# Patient Record
Sex: MALE | Race: BLACK OR AFRICAN AMERICAN | Employment: UNEMPLOYED | URBAN - METROPOLITAN AREA
[De-identification: names, ages, dates, MRNs, and addresses within clinical notes are randomized per-mention and may not be internally consistent; named-entity substitution may affect disease eponyms.]

---

## 2019-06-15 ENCOUNTER — HOSPITAL ENCOUNTER (INPATIENT)
Age: 44
LOS: 2 days | Discharge: HOME | DRG: 045 | End: 2019-06-17
Attending: EMERGENCY MEDICINE | Admitting: HOSPITALIST
Payer: MEDICAID

## 2019-06-15 ENCOUNTER — APPOINTMENT (OUTPATIENT)
Dept: CT IMAGING | Age: 44
DRG: 045 | End: 2019-06-15
Payer: MEDICAID

## 2019-06-15 ENCOUNTER — APPOINTMENT (OUTPATIENT)
Dept: GENERAL RADIOLOGY | Age: 44
DRG: 045 | End: 2019-06-15
Payer: MEDICAID

## 2019-06-15 DIAGNOSIS — I63.9 CEREBROVASCULAR ACCIDENT (CVA), UNSPECIFIED MECHANISM (HCC): ICD-10-CM

## 2019-06-15 DIAGNOSIS — G45.9 TIA (TRANSIENT ISCHEMIC ATTACK): Primary | ICD-10-CM

## 2019-06-15 DIAGNOSIS — R20.0 NUMBNESS: ICD-10-CM

## 2019-06-15 PROBLEM — I10 HYPERTENSION: Status: ACTIVE | Noted: 2019-06-15

## 2019-06-15 PROBLEM — Z72.0 TOBACCO ABUSE: Status: ACTIVE | Noted: 2019-06-15

## 2019-06-15 LAB
ALBUMIN SERPL-MCNC: 4.3 GM/DL (ref 3.4–5)
ALP BLD-CCNC: 73 IU/L (ref 40–128)
ALT SERPL-CCNC: 23 U/L (ref 10–40)
ANION GAP SERPL CALCULATED.3IONS-SCNC: 9 MMOL/L (ref 4–16)
APTT: 29.6 SECONDS (ref 21.2–33)
AST SERPL-CCNC: 17 IU/L (ref 15–37)
BASOPHILS ABSOLUTE: 0 K/CU MM
BASOPHILS RELATIVE PERCENT: 0.8 % (ref 0–1)
BILIRUB SERPL-MCNC: 0.2 MG/DL (ref 0–1)
BUN BLDV-MCNC: 11 MG/DL (ref 6–23)
CALCIUM SERPL-MCNC: 9 MG/DL (ref 8.3–10.6)
CHLORIDE BLD-SCNC: 101 MMOL/L (ref 99–110)
CO2: 25 MMOL/L (ref 21–32)
CREAT SERPL-MCNC: 0.9 MG/DL (ref 0.9–1.3)
DIFFERENTIAL TYPE: ABNORMAL
EOSINOPHILS ABSOLUTE: 0.2 K/CU MM
EOSINOPHILS RELATIVE PERCENT: 3.5 % (ref 0–3)
GFR AFRICAN AMERICAN: >60 ML/MIN/1.73M2
GFR NON-AFRICAN AMERICAN: >60 ML/MIN/1.73M2
GLUCOSE BLD-MCNC: 118 MG/DL (ref 70–99)
HCT VFR BLD CALC: 47.9 % (ref 42–52)
HEMOGLOBIN: 15.5 GM/DL (ref 13.5–18)
IMMATURE NEUTROPHIL %: 0.2 % (ref 0–0.43)
INR BLD: 0.97 INDEX
LYMPHOCYTES ABSOLUTE: 2.3 K/CU MM
LYMPHOCYTES RELATIVE PERCENT: 44.4 % (ref 24–44)
MCH RBC QN AUTO: 31.1 PG (ref 27–31)
MCHC RBC AUTO-ENTMCNC: 32.4 % (ref 32–36)
MCV RBC AUTO: 96.2 FL (ref 78–100)
MONOCYTES ABSOLUTE: 0.5 K/CU MM
MONOCYTES RELATIVE PERCENT: 9.7 % (ref 0–4)
NUCLEATED RBC %: 0 %
PDW BLD-RTO: 13.2 % (ref 11.7–14.9)
PLATELET # BLD: 248 K/CU MM (ref 140–440)
PMV BLD AUTO: 10.8 FL (ref 7.5–11.1)
POTASSIUM SERPL-SCNC: 3.7 MMOL/L (ref 3.5–5.1)
PROTHROMBIN TIME: 11.1 SECONDS (ref 9.12–12.5)
RBC # BLD: 4.98 M/CU MM (ref 4.6–6.2)
SEGMENTED NEUTROPHILS ABSOLUTE COUNT: 2.1 K/CU MM
SEGMENTED NEUTROPHILS RELATIVE PERCENT: 41.4 % (ref 36–66)
SODIUM BLD-SCNC: 135 MMOL/L (ref 135–145)
TOTAL IMMATURE NEUTOROPHIL: 0.01 K/CU MM
TOTAL NUCLEATED RBC: 0 K/CU MM
TOTAL PROTEIN: 7.3 GM/DL (ref 6.4–8.2)
TROPONIN T: <0.01 NG/ML
TSH HIGH SENSITIVITY: 0.94 UIU/ML (ref 0.27–4.2)
WBC # BLD: 5.1 K/CU MM (ref 4–10.5)

## 2019-06-15 PROCEDURE — 93005 ELECTROCARDIOGRAM TRACING: CPT | Performed by: EMERGENCY MEDICINE

## 2019-06-15 PROCEDURE — 99285 EMERGENCY DEPT VISIT HI MDM: CPT

## 2019-06-15 PROCEDURE — 6370000000 HC RX 637 (ALT 250 FOR IP): Performed by: HOSPITALIST

## 2019-06-15 PROCEDURE — 80053 COMPREHEN METABOLIC PANEL: CPT

## 2019-06-15 PROCEDURE — 85610 PROTHROMBIN TIME: CPT

## 2019-06-15 PROCEDURE — 71046 X-RAY EXAM CHEST 2 VIEWS: CPT

## 2019-06-15 PROCEDURE — 1200000000 HC SEMI PRIVATE

## 2019-06-15 PROCEDURE — 85025 COMPLETE CBC W/AUTO DIFF WBC: CPT

## 2019-06-15 PROCEDURE — 84484 ASSAY OF TROPONIN QUANT: CPT

## 2019-06-15 PROCEDURE — 84436 ASSAY OF TOTAL THYROXINE: CPT

## 2019-06-15 PROCEDURE — 84443 ASSAY THYROID STIM HORMONE: CPT

## 2019-06-15 PROCEDURE — 2580000003 HC RX 258: Performed by: HOSPITALIST

## 2019-06-15 PROCEDURE — 85730 THROMBOPLASTIN TIME PARTIAL: CPT

## 2019-06-15 PROCEDURE — 70450 CT HEAD/BRAIN W/O DYE: CPT

## 2019-06-15 RX ORDER — ATORVASTATIN CALCIUM 40 MG/1
40 TABLET, FILM COATED ORAL NIGHTLY
Status: DISCONTINUED | OUTPATIENT
Start: 2019-06-15 | End: 2019-06-17 | Stop reason: HOSPADM

## 2019-06-15 RX ORDER — NICOTINE 21 MG/24HR
1 PATCH, TRANSDERMAL 24 HOURS TRANSDERMAL DAILY
Status: DISCONTINUED | OUTPATIENT
Start: 2019-06-16 | End: 2019-06-17 | Stop reason: HOSPADM

## 2019-06-15 RX ORDER — SODIUM CHLORIDE 0.9 % (FLUSH) 0.9 %
10 SYRINGE (ML) INJECTION PRN
Status: DISCONTINUED | OUTPATIENT
Start: 2019-06-15 | End: 2019-06-17 | Stop reason: HOSPADM

## 2019-06-15 RX ORDER — SODIUM CHLORIDE 0.9 % (FLUSH) 0.9 %
10 SYRINGE (ML) INJECTION EVERY 12 HOURS SCHEDULED
Status: DISCONTINUED | OUTPATIENT
Start: 2019-06-15 | End: 2019-06-17 | Stop reason: HOSPADM

## 2019-06-15 RX ORDER — ASPIRIN 81 MG/1
81 TABLET ORAL DAILY
Status: DISCONTINUED | OUTPATIENT
Start: 2019-06-16 | End: 2019-06-17 | Stop reason: HOSPADM

## 2019-06-15 RX ORDER — ASPIRIN 81 MG/1
324 TABLET, CHEWABLE ORAL ONCE
Status: COMPLETED | OUTPATIENT
Start: 2019-06-15 | End: 2019-06-15

## 2019-06-15 RX ORDER — IBUPROFEN 200 MG
800 TABLET ORAL EVERY 6 HOURS PRN
COMMUNITY

## 2019-06-15 RX ORDER — ONDANSETRON 2 MG/ML
4 INJECTION INTRAMUSCULAR; INTRAVENOUS EVERY 6 HOURS PRN
Status: DISCONTINUED | OUTPATIENT
Start: 2019-06-15 | End: 2019-06-17 | Stop reason: HOSPADM

## 2019-06-15 RX ADMIN — ASPIRIN 81 MG 324 MG: 81 TABLET ORAL at 22:08

## 2019-06-15 RX ADMIN — SODIUM CHLORIDE, PRESERVATIVE FREE 10 ML: 5 INJECTION INTRAVENOUS at 23:54

## 2019-06-15 RX ADMIN — ATORVASTATIN CALCIUM 40 MG: 40 TABLET, FILM COATED ORAL at 23:54

## 2019-06-15 ASSESSMENT — PAIN SCALES - GENERAL: PAINLEVEL_OUTOF10: 0

## 2019-06-15 NOTE — LETTER
[facility] [place] [facility] [id number]  Phone: [phone]             June 18, 2019    Patient: Kenney Morrison   YOB: 1975   Date of Visit: 6/15/2019       To Whom It May Concern:    Kenney Morrison was seen and treated in our facility  beginning 6/15/2019 until 6/17/19. He may return to work on 6/18/19. Sincerely,     Dr Maria Teresa Musa M.D.          Signature:__________________________________

## 2019-06-15 NOTE — ED PROVIDER NOTES
Tobacco Use    Smoking status: Current Every Day Smoker     Packs/day: 1.00     Types: Cigarettes   Substance and Sexual Activity    Alcohol use: Not Currently    Drug use: Yes     Types: Marijuana    Sexual activity: Not Currently   Lifestyle    Physical activity:     Days per week: None     Minutes per session: None    Stress: None   Relationships    Social connections:     Talks on phone: None     Gets together: None     Attends Rastafarian service: None     Active member of club or organization: None     Attends meetings of clubs or organizations: None     Relationship status: None    Intimate partner violence:     Fear of current or ex partner: None     Emotionally abused: None     Physically abused: None     Forced sexual activity: None   Other Topics Concern    None   Social History Narrative    None       Medications/Allergies     Previous Medications    No medications on file     No Known Allergies     Physical Exam       ED Triage Vitals [06/15/19 1644]   BP Temp Temp Source Pulse Resp SpO2 Height Weight   (!) 180/95 98.5 °F (36.9 °C) Oral 76 16 98 % 6' (1.829 m) 185 lb (83.9 kg)     GENERAL APPEARANCE: Awake and alert. Cooperative. No acute distress. HEAD: Normocephalic. Atraumatic. EYES: Sclera anicteric. ENT: Tolerates saliva. No trismus. NECK: Supple. Trachea midline. CARDIO: RRR. Radial pulse 2+. LUNGS: Respirations unlabored. CTAB. ABDOMEN: Soft. Non-distended. Non-tender. EXTREMITIES: No acute deformities. No pitting edema  SKIN: Warm and dry. NEUROLOGICAL: No gross facial drooping. Moves all 4 extremities spontaneously. Cranial nerves II through XII are grossly intact, 5 out of 5 strength in the bilateral upper and lower extremities, sensation is intact throughout, negative pronator drift, intact finger-to-nose, negative heel-to-shin, patient ambulates without difficulty, alert and oriented x4  PSYCHIATRIC: Normal mood.      Diagnostics   Labs:  Results for orders placed or performed during the hospital encounter of 06/15/19   CBC Auto Differential   Result Value Ref Range    WBC 5.1 4.0 - 10.5 K/CU MM    RBC 4.98 4.6 - 6.2 M/CU MM    Hemoglobin 15.5 13.5 - 18.0 GM/DL    Hematocrit 47.9 42 - 52 %    MCV 96.2 78 - 100 FL    MCH 31.1 (H) 27 - 31 PG    MCHC 32.4 32.0 - 36.0 %    RDW 13.2 11.7 - 14.9 %    Platelets 406 498 - 829 K/CU MM    MPV 10.8 7.5 - 11.1 FL    Differential Type AUTOMATED DIFFERENTIAL     Segs Relative 41.4 36 - 66 %    Lymphocytes % 44.4 (H) 24 - 44 %    Monocytes % 9.7 (H) 0 - 4 %    Eosinophils % 3.5 (H) 0 - 3 %    Basophils % 0.8 0 - 1 %    Segs Absolute 2.1 K/CU MM    Lymphocytes # 2.3 K/CU MM    Monocytes # 0.5 K/CU MM    Eosinophils # 0.2 K/CU MM    Basophils # 0.0 K/CU MM    Nucleated RBC % 0.0 %    Total Nucleated RBC 0.0 K/CU MM    Total Immature Neutrophil 0.01 K/CU MM    Immature Neutrophil % 0.2 0 - 0.43 %   Comprehensive Metabolic Panel w/ Reflex to MG   Result Value Ref Range    Sodium 135 135 - 145 MMOL/L    Potassium 3.7 3.5 - 5.1 MMOL/L    Chloride 101 99 - 110 mMol/L    CO2 25 21 - 32 MMOL/L    BUN 11 6 - 23 MG/DL    CREATININE 0.9 0.9 - 1.3 MG/DL    Glucose 118 (H) 70 - 99 MG/DL    Calcium 9.0 8.3 - 10.6 MG/DL    Alb 4.3 3.4 - 5.0 GM/DL    Total Protein 7.3 6.4 - 8.2 GM/DL    Total Bilirubin 0.2 0.0 - 1.0 MG/DL    ALT 23 10 - 40 U/L    AST 17 15 - 37 IU/L    Alkaline Phosphatase 73 40 - 128 IU/L    GFR Non-African American >60 >60 mL/min/1.73m2    GFR African American >60 >60 mL/min/1.73m2    Anion Gap 9 4 - 16     Radiographs:  No results found. Procedures/EK lead EKG per my interpretation:  Sinus bradycardia  Axis is   Normal  QTc is  normal  There is no specific T wave changes appreciated. There is no specific ST wave changes appreciated.   Prior EKG to compare with was not available V4-V6 with motion artifact    ED Course and MDM   In brief, Jesika Blackburn is a 37 y.o. male who presented to the emergency department with numbness that was concerning for a TIA, however his CT scan demonstrated evidence of stroke with a resolved neuro exam at this time. Plan for admission. ED Medication Orders (From admission, onward)    None          Final Impression      1. TIA (transient ischemic attack)    2. Numbness    3.  Cerebrovascular accident (CVA), unspecified mechanism ([facility] [address]      DISPOSITION  : Admitted     (Please note that portions of this note may have been completed with a voice recognition program. Efforts were made to edit the dictations but occasionally words are mis-transcribed.)    Radha Fernando MD  [address] [facility] Griffin Marr MD  06/25/19 [id number]

## 2019-06-16 ENCOUNTER — APPOINTMENT (OUTPATIENT)
Dept: MRI IMAGING | Age: 44
DRG: 045 | End: 2019-06-16
Payer: MEDICAID

## 2019-06-16 ENCOUNTER — APPOINTMENT (OUTPATIENT)
Dept: ULTRASOUND IMAGING | Age: 44
DRG: 045 | End: 2019-06-16
Payer: MEDICAID

## 2019-06-16 ENCOUNTER — APPOINTMENT (OUTPATIENT)
Dept: CT IMAGING | Age: 44
DRG: 045 | End: 2019-06-16
Payer: MEDICAID

## 2019-06-16 LAB
APTT: 31.4 SECONDS (ref 21.2–33)
CHOLESTEROL: 158 MG/DL
ESTIMATED AVERAGE GLUCOSE: 131 MG/DL
FIBRINOGEN LEVEL: 306 MG/DL (ref 196.9–442.1)
FOLATE: 7.8 NG/ML (ref 3.1–17.5)
HBA1C MFR BLD: 6.2 % (ref 4.2–6.3)
HCT VFR BLD CALC: 46.5 % (ref 42–52)
HDLC SERPL-MCNC: 38 MG/DL
HEMOGLOBIN: 15.3 GM/DL (ref 13.5–18)
HOMOCYSTEINE: ABNORMAL UMOL/L (ref 0–10)
HOMOCYSTEINE: NORMAL UMOL/L (ref 0–10)
INR BLD: 1.02 INDEX
LDL CHOLESTEROL DIRECT: 109 MG/DL
MCH RBC QN AUTO: 31.2 PG (ref 27–31)
MCHC RBC AUTO-ENTMCNC: 32.9 % (ref 32–36)
MCV RBC AUTO: 94.7 FL (ref 78–100)
PDW BLD-RTO: 13 % (ref 11.7–14.9)
PLATELET # BLD: 222 K/CU MM (ref 140–440)
PMV BLD AUTO: 11.3 FL (ref 7.5–11.1)
PROTHROMBIN TIME: 11.6 SECONDS (ref 9.12–12.5)
RBC # BLD: 4.91 M/CU MM (ref 4.6–6.2)
TRIGL SERPL-MCNC: 131 MG/DL
VITAMIN B-12: 464.8 PG/ML (ref 211–911)
WBC # BLD: 4.9 K/CU MM (ref 4–10.5)

## 2019-06-16 PROCEDURE — 85610 PROTHROMBIN TIME: CPT

## 2019-06-16 PROCEDURE — 2580000003 HC RX 258: Performed by: HOSPITALIST

## 2019-06-16 PROCEDURE — 93880 EXTRACRANIAL BILAT STUDY: CPT

## 2019-06-16 PROCEDURE — 83721 ASSAY OF BLOOD LIPOPROTEIN: CPT

## 2019-06-16 PROCEDURE — 70496 CT ANGIOGRAPHY HEAD: CPT

## 2019-06-16 PROCEDURE — 85384 FIBRINOGEN ACTIVITY: CPT

## 2019-06-16 PROCEDURE — 81241 F5 GENE: CPT

## 2019-06-16 PROCEDURE — 82607 VITAMIN B-12: CPT

## 2019-06-16 PROCEDURE — 85305 CLOT INHIBIT PROT S TOTAL: CPT

## 2019-06-16 PROCEDURE — 70551 MRI BRAIN STEM W/O DYE: CPT

## 2019-06-16 PROCEDURE — 80061 LIPID PANEL: CPT

## 2019-06-16 PROCEDURE — 85303 CLOT INHIBIT PROT C ACTIVITY: CPT

## 2019-06-16 PROCEDURE — 85300 ANTITHROMBIN III ACTIVITY: CPT

## 2019-06-16 PROCEDURE — 83090 ASSAY OF HOMOCYSTEINE: CPT

## 2019-06-16 PROCEDURE — 86148 ANTI-PHOSPHOLIPID ANTIBODY: CPT

## 2019-06-16 PROCEDURE — 82746 ASSAY OF FOLIC ACID SERUM: CPT

## 2019-06-16 PROCEDURE — 99255 IP/OBS CONSLTJ NEW/EST HI 80: CPT | Performed by: PSYCHIATRY & NEUROLOGY

## 2019-06-16 PROCEDURE — 81240 F2 GENE: CPT

## 2019-06-16 PROCEDURE — 6370000000 HC RX 637 (ALT 250 FOR IP): Performed by: HOSPITALIST

## 2019-06-16 PROCEDURE — 85027 COMPLETE CBC AUTOMATED: CPT

## 2019-06-16 PROCEDURE — 1200000000 HC SEMI PRIVATE

## 2019-06-16 PROCEDURE — 83036 HEMOGLOBIN GLYCOSYLATED A1C: CPT

## 2019-06-16 PROCEDURE — 6360000002 HC RX W HCPCS: Performed by: HOSPITALIST

## 2019-06-16 PROCEDURE — 85240 CLOT FACTOR VIII AHG 1 STAGE: CPT

## 2019-06-16 PROCEDURE — 86147 CARDIOLIPIN ANTIBODY EA IG: CPT

## 2019-06-16 PROCEDURE — 6370000000 HC RX 637 (ALT 250 FOR IP): Performed by: INTERNAL MEDICINE

## 2019-06-16 PROCEDURE — 2580000003 HC RX 258: Performed by: NURSE PRACTITIONER

## 2019-06-16 PROCEDURE — 97161 PT EVAL LOW COMPLEX 20 MIN: CPT

## 2019-06-16 PROCEDURE — 36415 COLL VENOUS BLD VENIPUNCTURE: CPT

## 2019-06-16 PROCEDURE — 6360000004 HC RX CONTRAST MEDICATION: Performed by: NURSE PRACTITIONER

## 2019-06-16 PROCEDURE — 93010 ELECTROCARDIOGRAM REPORT: CPT | Performed by: INTERNAL MEDICINE

## 2019-06-16 PROCEDURE — 85730 THROMBOPLASTIN TIME PARTIAL: CPT

## 2019-06-16 PROCEDURE — 81291 MTHFR GENE: CPT

## 2019-06-16 RX ORDER — 0.9 % SODIUM CHLORIDE 0.9 %
10 VIAL (ML) INJECTION
Status: COMPLETED | OUTPATIENT
Start: 2019-06-16 | End: 2019-06-16

## 2019-06-16 RX ORDER — CLONIDINE HYDROCHLORIDE 0.1 MG/1
0.1 TABLET ORAL EVERY 4 HOURS PRN
Status: DISCONTINUED | OUTPATIENT
Start: 2019-06-16 | End: 2019-06-17

## 2019-06-16 RX ADMIN — SODIUM CHLORIDE, PRESERVATIVE FREE 10 ML: 5 INJECTION INTRAVENOUS at 09:29

## 2019-06-16 RX ADMIN — ENOXAPARIN SODIUM 40 MG: 40 INJECTION SUBCUTANEOUS at 08:37

## 2019-06-16 RX ADMIN — ATORVASTATIN CALCIUM 40 MG: 40 TABLET, FILM COATED ORAL at 20:24

## 2019-06-16 RX ADMIN — IOPAMIDOL 80 ML: 755 INJECTION, SOLUTION INTRAVENOUS at 09:29

## 2019-06-16 RX ADMIN — CLONIDINE HYDROCHLORIDE 0.1 MG: 0.1 TABLET ORAL at 20:23

## 2019-06-16 RX ADMIN — ASPIRIN 81 MG: 81 TABLET, COATED ORAL at 08:37

## 2019-06-16 RX ADMIN — SODIUM CHLORIDE, PRESERVATIVE FREE 10 ML: 5 INJECTION INTRAVENOUS at 08:37

## 2019-06-16 ASSESSMENT — PAIN SCALES - GENERAL
PAINLEVEL_OUTOF10: 0

## 2019-06-16 NOTE — PROGRESS NOTES
Physical Therapy    Facility/Department: [facility] 3E  Initial Assessment    NAME: Marcelino Gan  : 1975  MRN: [id number]    Date of Service: 2019    Discharge Recommendations:  Home independently      Assessment   Body structures, Functions, Activity limitations: Decreased cognition  Assessment: The patient is a 37year old male admitted with right facial droop, left sided weakness and some slurring of speech per pt account. Patient had similar symptoms about one week ago but these resolved. The pt reports the numbness has been intermittent about three times in the past day as well. CT head did show acute infarcts in the right frontal and parietal lobe. The pt was certainly eager to leave the hospital.  Neurology NP came in to see patient during evaluation and explained to the patient the need for more follow up, including a heart monitor. The patient became tearful, overwhelmed by the gravity of the information and not being able to be home on Father's Day which was the reason he made the trip to [place]. Aside from some memory problems (he left the water running after washing his hands), the pt demonstrated good strength and functional mobility. No visual or perceptual deficits were noted either. Do not anticipate he will require rehab follow up, but he is aware that he is at higher risk for another stroke. Prognosis: Excellent  Decision Making: Low Complexity  Clinical Presentation: uncomplicated  Patient Education: importance of quitting smoking and making slow but steady changes to lifestyle rather than trying to do everything at once and setting himself up for failure  No Skilled PT: Independent with functional mobility   REQUIRES PT FOLLOW UP: No  Activity Tolerance  Activity Tolerance: Patient Tolerated treatment well       Patient Diagnosis(es): The primary encounter diagnosis was TIA (transient ischemic attack).  Diagnoses of Numbness and Cerebrovascular accident (CVA), reach      AM-PAC Score     AM-PAC Inpatient Mobility without Stair Climbing Raw Score : 20 (06/16/19 1043)  AM-PAC Inpatient without Stair Climbing T-Scale Score : 60.57 (06/16/19 1043)  Mobility Inpatient CMS 0-100% Score: 0 (06/16/19 1043)  Mobility Inpatient without Stair CMS G-Code Modifier : 509 [address] (06/16/19 1043)       Goals  Short term goals  Time Frame for Short term goals: 1 session  Short term goal 1: Pt will participate in PT evaluation to establish appropriate discharge recommendations  Patient Goals   Patient goals : wants to go home       Therapy Time   Individual Concurrent Group Co-treatment   Time In 1005         Time Out 1025         Minutes [id number] [address]  #[id number]  6/16/2019  10:47 AM

## 2019-06-16 NOTE — ED NOTES
21:56 received dirty bed 3008 -- notified Abhijit Pacheco RN @ 21:59     Natali Santos  06/15/19 2201       Natali Santos  06/15/19 2202

## 2019-06-16 NOTE — ED NOTES
21:07 paged 1616 [facility] thru perfect serve     Nickie Clemons  06/15/19 6202

## 2019-06-16 NOTE — H&P
[facility] Hospitalist History & Physical      Name: [facility] Tonja    PCP: No primary care provider on file. Date of Admission: 6/15/2019    Admitting Physician: Nicolasa Waite MD Hospitalist    Date of Service: Pt seen/examined on 6/15/2019     Chief Complaint:  Numbness    History Of Present Illness: The patient is a 37 y.o. male with hypertension, tobacco abuse of 1ppd, who presented to ED with right facial droop, left upper and lower extremity weakness/numbness for 5 min earlier today. He was at rest.  He says he felt dizzy, and felt like he is going to pass out. His girl friend said he had slurry speech at that time. He had similar symptoms 1 week ago, which resolved in 10 minutes or so. In ED, he did not have symptoms initially, but during my evaluation, he complained of intermittent numbness and weakness of left upper and lower ext. CT head in ED showed evidence of Acute infarcts within the right frontal lobe and right parietal lobe. The case was discussed with the ED provider. Past Medical History Reviewed:    Patient  has a past medical history of Hypertension. Borderline diabetes    Past Surgical History Reviewed:    Patient denies    Medications Prior to Admission Reviewed:    Prior to Admission medications    Not on File   He was on hydrochlorithiazide  Ibuprofen prn. Allergies:  Patient has no known allergies. Social History:    TOBACCO:   reports that he has been smoking cigarettes. He has been smoking about 1.00 pack per day. He does not have any smokeless tobacco history on file. ETOH:  Denies. Drugs:  reports that he has current or past drug history. Drug: Marijuana. Occupation:      Family History:  Reviewed in detail. Positive as follows: Mother has stroke in 46s. Father has diabetes.        ROS:  Ten systems were reviewed and otherwise acutely negative except as noted in the HPI and documented below:     Physical Exam:  Vitals:  BP (!) 162/104 Pulse 54   Temp 98.5 °F (36.9 °C) (Oral)   Resp 22   Ht 6' (1.829 m)   Wt 185 lb (83.9 kg)   SpO2 97%   BMI 25.09 kg/m²   General: The patient appears as stated age. Well appearing, and in no distress. Mental status: Alert, Oriented x3. Coherent. No agitation. Eyes: CATHY. Normal conjunctiva. ENT/Mouth: normal appearing jaw and neck, no neck nodes or sinus tenderness. Clear oropharynx with moist mucous membrane. Cardiovascular:  normal rate, regular rhythm, normal S1, S2, no murmurs, rubs, clicks or gallops. No peripheral edema. Dorsal pedis pulses 2+ bilaterally. Respiratory: clear to auscultation, no wheezes, rales or rhonchi, symmetric air entry. Gastrointestinal: soft, nontender, nondistended, no masses or organomegaly. Genitourinary:  No CVA tenderness. Musculoskletal:  no clubbing or cyanosis. No joint swelling, warmth, or tenderness. Skin:  normal coloration and turgor, no rashes, no suspicious skin lesions noted. Neurologic: Normal speech, no focal findings. CN II-XII were intact. Muscle strength 5 out of 5 in ford upper and lower extremities. Arm drift test was normal.  Sensory was normal.  F-N exam was normal bilaterally. Hematologic/Lymphatic: No cervical lymphadenopathy. Data  (4-points for high level)  Laboratory this visit:  Reviewed    Radiology this visit:  Personally reviewed the following imaging films, and agree with the radiologist readings. Xr Chest Standard (2 Vw)    Result Date: 6/15/2019  EXAMINATION: TWO XRAY VIEWS OF THE CHEST 6/15/2019 7:42 pm COMPARISON: None. HISTORY: ORDERING SYSTEM PROVIDED HISTORY: weakness TECHNOLOGIST PROVIDED HISTORY: Reason for exam:->weakness Ordering Physician Provided Reason for Exam: weakness Acuity: Unknown Type of Exam: Unknown Initial encounter FINDINGS: The lungs are without acute focal process. There is no effusion or pneumothorax. The cardiomediastinal silhouette is without acute process.  The osseous structures are without acute process. No acute process. Ct Head Wo Contrast    Addendum Date: 6/15/2019    ADDENDUM: Findings were discussed with Mohsen Brown at 9:01 pm on 6/15/2019. Result Date: 6/15/2019  EXAMINATION: CT OF THE HEAD WITHOUT CONTRAST  6/15/2019 8:48 pm TECHNIQUE: CT of the head was performed without the administration of intravenous contrast. Dose modulation, iterative reconstruction, and/or weight based adjustment of the mA/kV was utilized to reduce the radiation dose to as low as reasonably achievable. COMPARISON: None. HISTORY: ORDERING SYSTEM PROVIDED HISTORY: left sided weakness, numbness that resolved TECHNOLOGIST PROVIDED HISTORY: Has a \"code stroke\" or \"stroke alert\" been called? ->No Ordering Physician Provided Reason for Exam: left sided weakness, numbness that resolved Acuity: Acute Type of Exam: Initial Relevant Medical/Surgical History: none FINDINGS: BRAIN/VENTRICLES: There is loss of gray-white differentiation within the paramedian right frontal lobe likely reflecting an infarct. No mass or shift or bleed is identified. There is an additional acute infarct within the high right parietal lobe. ORBITS: The visualized portion of the orbits demonstrate no acute abnormality. SINUSES: The visualized paranasal sinuses and mastoid air cells demonstrate no acute abnormality. SOFT TISSUES/SKULL:  No acute abnormality of the visualized skull or soft tissues. Acute infarcts within the right frontal lobe and right parietal lobe. EKG this visit:  Personally reviewed EKG and telemetry strip. Documents:  Recent previous records, labs, imaging in the EMR were reviewed. Assessments and plans: Active Problems:    Ischemic stroke ([facility] [address]    Hypertension    Tobacco abuse  Resolved Problems:    * No resolved hospital problems. *    Acute right hemisphere ischemic stroke with left sided weakness  - admit as inpatient.    - neuro check q4h  - start aspirin 325mg x1, then 81mg daily  - start lipitor 40mg daily. Check lipid panel  - hold BP meds for permissive hypertension  - check TSH, homocysteine, vit B12/folate  - check carotid US, echo  - check MRI/MRA brain. - neurology consultation  - PT/OT    Hypertension  - hold BP meds as above. Smoking  - counseled to quit today in ED  - nicotine patch. DVT prophylaxis - lovenox. The above assessments and plans were explained to the patient in lay language, who indicated understanding.       PHYSICIAN CERTIFICATION  I certify that [facility] MERI  JALYN is expected to be hospitalized for greater than 2 midnights based on the above assessment and plan:      Francisca Love MD  [facility]  Office Phone: [phone]

## 2019-06-16 NOTE — ED NOTES
21:16 Dr Ford Bolaños returned call -- parked call @ [phone] -- notified Dr Aruna Sanchez [address]  06/15/19 5984

## 2019-06-16 NOTE — PROGRESS NOTES
Hospitalist Progress Note      Name:  Shabbir Gonzales /Age/Sex: 1975  (37 y.o. male)   MRN & CSN:  [id number] & [id number] Admission Date/Time: 6/15/2019  5:27 PM   Location:  [address] [place] [address] PCP: No primary care provider on file. Hospital Day: 2    Assessment and Plan:   Shabbir Gonzales is a 37 y.o.  male  who presents with <principal problem not specified>      Acute right hemisphere ischemic stroke with left sided weakness  CTA: acute infarcts in the paramidline right frontal and parietal lobesOcclusion of the right NASREEN at the proximal A2.  neuro check q4h  start aspirin 325mg x1, then 81mg daily  start lipitor 40mg daily. lipid panel , A1C= 6.2  hold BP meds for permissive hypertension  TSH, homocysteine, vit B12/folate noted  Carotid US, echo  MRI/MRA brain. neurology consultation  PT/OT--> Home      Hypertension  - hold BP meds as above.      Smoking  - counseled to quit  - nicotine patch.          Chronic treatment continued per home medications unless  contraindicated by above plan and assessment. The above assessment/plan has been explained to the patient, who indicated understanding. Diet DIET CARDIAC;   DVT Prophylaxis [x] Lovenox, []  Heparin, [] SCDs, []No VTE prophylaxis, patient ambulating   GI Prophylaxis [] PPI, [] H2 Blocker, [x] No GI prophylaxis, patient is receiving diet/Tube Feeds   Code Status Full Code             History of Present Illness:     Chief Complaint:   37 y.o. male with PMH of  hypertension, tobacco abuse of 1ppd, who presented to ED with right facial droop, left upper and lower extremity weakness/numbness for 5 min. He says he felt dizzy, and felt like he is going to pass out. His girl friend said he had slurry speech at that time. He had similar symptoms 1 week ago, which resolved in 10 minutes or so.    CT head in ED showed evidence of Acute infarcts within the right frontal lobe and right parietal lobe    Patient was seen and examined at bedside today. Patient sitting up comfortably in bed in NAD. Ten point ROS reviewed negative, unless as noted above    Objective: Intake/Output Summary (Last 24 hours) at 6/16/2019 1114  Last data filed at 6/16/2019 0836  Gross per 24 hour   Intake 20 ml   Output --   Net 20 ml      Vitals:   Vitals:    06/16/19 0800   BP: (!) 153/102   Pulse: 63   Resp: 14   Temp: 98.1 °F (36.7 °C)   SpO2: 98%     Physical Exam:      GEN Awake male, sitting upright in bed in no apparent distress. Appears given age. EYES Pupils are equally round. No scleral erythema, discharge, or conjunctivitis. HENT Mucous membranes are moist.   RESP Clear to auscultation, no wheezes, rales or rhonchi. CARDIO/VASC S1/S2 auscultated. No murmurs  GI Abdomen is soft without significant tenderness, masses, or guarding. MSK No gross joint deformities. Spontaneous movement of all extremities  SKIN Normal coloration, warm, dry.   NEURO Grossly normal  PSYCH Awake, alert, oriented     Medications:   Medications:    sodium chloride flush  10 mL Intravenous 2 times per day    enoxaparin  40 mg Subcutaneous Daily    aspirin  81 mg Oral Daily    atorvastatin  40 mg Oral Nightly    nicotine  1 patch Transdermal Daily      Infusions:   PRN Meds:   sodium chloride flush 10 mL PRN   magnesium hydroxide 30 mL Daily PRN   ondansetron 4 mg Q6H PRN         Electronically signed by Thalia Philip MD on 6/16/2019 at 11:14 AM

## 2019-06-16 NOTE — ED NOTES
22:20 bed 3008 now clean and ready -- notified Carlos Mayer RN @ 22:27     Lisa Santos  06/15/19 2223

## 2019-06-16 NOTE — CONSULTS
Neurology Service Consult Note  [facility]   Patient Name: Rhonda Brewster  : 1975        Subjective:   Reason for consult: \"my left arm and leg are weak\"  37 y.o. right-handed male with past medical history hypertension and tobacco usepresenting [facility] with complaints of left arm and leg weakness, described as a clumsiness that's been occurring for the last 7 days. Patient reports he first noticed the symptoms on Saturday but they resolved within 30 minutes to an hour, however yesterday evening Saturday, while patient was here visiting family the symptoms recurred and did not go away that she reported to the emergency room. .  A stroke alert was called in the ER evaluation performed without any use of TPA or intervention is patient's last seen normal was greater than 7 days. Denies any history of stroke or seizure. He was not on aspirin. Denies history of atrial fibrillation. States he smoked 2 packs of cigarettes a day. Does not use recreational drugs. Is not alcohol dependent. States that he does have history of hypertension. His mom had a stroke 2 years ago. Symptoms have resolved during my exam.    Patient reports with his symptoms had no associated headache, lightheadedness or dizziness, no diplopia or loss of vision, no facial droop, dysarthria or aphasia. He did have associated numbness and arm that has resolved. He did not fall he did not hit his head, he did not have loss of consciousness. Patient reports that he has heart palpitations when he is anxious or having a panic attack but does not notice them at rest.  He does report that he snores but does not wake himself up from choking. She lives in [place], [place], his wife/girlfriend is on her way her name is . does express he wants to go back to [place]. Past Medical History:   Diagnosis Date    Hypertension     :   History reviewed.  No pertinent surgical A&O x 4, NAD, cooperative  HEENT: NC/AT, EOMI, PERRL, mmm, no carotid bruits, neck supple, no meningeal signs; Heart: regular  Lungs: no distress  Ext: no edema, no calf tenderness b/l  Psych: normal mood and affect  Skin: no rashes or lesions    NEUROLOGIC EXAM:    Mental Status: A&O to self, location, month and year, NAD, speech clear, language fluent, repetition and naming intact, follows commands appropriately    Cranial Nerve Exam:   CN II-XII: , PERRL, VFF, no nystagmus, no gaze paresis, sensation V1-V3 intact b/l, muscles of facial expression symmetric; hearing intact to conversational tone, palate elevates symmetrically, shoulder elevation symmetric and tongue protrudes midline with movement side to side. Motor Exam:       Strength 5/5 UE's/LE's b/l  Tone and bulk normal   No pronator drift    Deep Tendon Reflexes: 2/4 biceps, triceps, brachioradialis, patellar, and achilles b/l; flexor plantar responses b/l    Sensation: Intact light touch/temp UE's/LE's b/l    Coordination/Cerebellum:       Tremors--none      Rapidly alternating movements: no dysdiadochokinesia b/l                Heel-to-Shin: no dysmetria b/l      Finger-to-Nose: no dysmetria b/l    Gait and stance:      Gait: 3 no ataxia      LABS:     Recent Labs     06/15/19  1746   WBC 5.1      K 3.7      CO2 25   BUN 11   CREATININE 0.9   GLUCOSE 118*   INR 0.97     Results for Zaida Pabon (MRN [id number]) as of 6/16/2019 07:33   Ref. Range 6/16/2019 04:27   Hemoglobin A1C Latest Ref Range: 4.2 - 6.3 % 6.2     Results for Zaida Pabon (MRN [id number]) as of 6/16/2019 07:33   Ref. Range 6/16/2019 04:27   Hemoglobin A1C Latest Ref Range: 4.2 - 6.3 % 6.2     Hypercoaguable work up pending     IMAGING:    CT Head: stating acute infarctions R-MCA  CUS: 0-50% bilateally  MRI:   1. Scattered acute infarcts involving the right frontal lobe with a few acute   infarcts also seen in the right parietal lobe.    2. No evidence of mass effect

## 2019-06-17 VITALS
RESPIRATION RATE: 12 BRPM | HEIGHT: 72 IN | OXYGEN SATURATION: 97 % | DIASTOLIC BLOOD PRESSURE: 102 MMHG | SYSTOLIC BLOOD PRESSURE: 167 MMHG | TEMPERATURE: 97.6 F | HEART RATE: 65 BPM | WEIGHT: 203.1 LBS | BODY MASS INDEX: 27.51 KG/M2

## 2019-06-17 LAB
ANION GAP SERPL CALCULATED.3IONS-SCNC: 8 MMOL/L (ref 4–16)
BUN BLDV-MCNC: 12 MG/DL (ref 6–23)
CALCIUM SERPL-MCNC: 9 MG/DL (ref 8.3–10.6)
CHLORIDE BLD-SCNC: 104 MMOL/L (ref 99–110)
CO2: 25 MMOL/L (ref 21–32)
CREAT SERPL-MCNC: 0.9 MG/DL (ref 0.9–1.3)
GFR AFRICAN AMERICAN: >60 ML/MIN/1.73M2
GFR NON-AFRICAN AMERICAN: >60 ML/MIN/1.73M2
GLUCOSE BLD-MCNC: 100 MG/DL (ref 70–99)
LV EF: 53 %
LV EF: 58 %
LVEF MODALITY: NORMAL
LVEF MODALITY: NORMAL
MAGNESIUM: 2.2 MG/DL (ref 1.8–2.4)
POTASSIUM SERPL-SCNC: 4 MMOL/L (ref 3.5–5.1)
SODIUM BLD-SCNC: 137 MMOL/L (ref 135–145)
TROPONIN T: <0.01 NG/ML
TROPONIN T: <0.01 NG/ML

## 2019-06-17 PROCEDURE — 6370000000 HC RX 637 (ALT 250 FOR IP): Performed by: HOSPITALIST

## 2019-06-17 PROCEDURE — 36415 COLL VENOUS BLD VENIPUNCTURE: CPT

## 2019-06-17 PROCEDURE — 2580000003 HC RX 258: Performed by: HOSPITALIST

## 2019-06-17 PROCEDURE — 84484 ASSAY OF TROPONIN QUANT: CPT

## 2019-06-17 PROCEDURE — 7100000001 HC PACU RECOVERY - ADDTL 15 MIN

## 2019-06-17 PROCEDURE — 93306 TTE W/DOPPLER COMPLETE: CPT

## 2019-06-17 PROCEDURE — 7100000000 HC PACU RECOVERY - FIRST 15 MIN

## 2019-06-17 PROCEDURE — 6370000000 HC RX 637 (ALT 250 FOR IP): Performed by: INTERNAL MEDICINE

## 2019-06-17 PROCEDURE — 6360000002 HC RX W HCPCS: Performed by: INTERNAL MEDICINE

## 2019-06-17 PROCEDURE — 83735 ASSAY OF MAGNESIUM: CPT

## 2019-06-17 PROCEDURE — 99253 IP/OBS CNSLTJ NEW/EST LOW 45: CPT | Performed by: INTERNAL MEDICINE

## 2019-06-17 PROCEDURE — 80048 BASIC METABOLIC PNL TOTAL CA: CPT

## 2019-06-17 PROCEDURE — 93312 ECHO TRANSESOPHAGEAL: CPT

## 2019-06-17 PROCEDURE — 6360000002 HC RX W HCPCS: Performed by: HOSPITALIST

## 2019-06-17 RX ORDER — CHLORTHALIDONE 25 MG/1
25 TABLET ORAL DAILY
Qty: 30 TABLET | Refills: 3 | Status: SHIPPED | OUTPATIENT
Start: 2019-06-17

## 2019-06-17 RX ORDER — LISINOPRIL 10 MG/1
10 TABLET ORAL DAILY
Status: DISCONTINUED | OUTPATIENT
Start: 2019-06-17 | End: 2019-06-17 | Stop reason: HOSPADM

## 2019-06-17 RX ORDER — CHLORTHALIDONE 25 MG/1
25 TABLET ORAL DAILY
Status: DISCONTINUED | OUTPATIENT
Start: 2019-06-17 | End: 2019-06-17 | Stop reason: HOSPADM

## 2019-06-17 RX ORDER — ASPIRIN 81 MG/1
81 TABLET ORAL DAILY
Qty: 30 TABLET | Refills: 3 | Status: SHIPPED | OUTPATIENT
Start: 2019-06-18

## 2019-06-17 RX ORDER — NIFEDIPINE 30 MG/1
30 TABLET, EXTENDED RELEASE ORAL DAILY
Status: DISCONTINUED | OUTPATIENT
Start: 2019-06-17 | End: 2019-06-17 | Stop reason: HOSPADM

## 2019-06-17 RX ORDER — NIFEDIPINE 30 MG/1
30 TABLET, FILM COATED, EXTENDED RELEASE ORAL DAILY
Qty: 30 TABLET | Refills: 3 | Status: SHIPPED | OUTPATIENT
Start: 2019-06-17

## 2019-06-17 RX ORDER — LISINOPRIL 10 MG/1
10 TABLET ORAL DAILY
Qty: 30 TABLET | Refills: 3 | Status: SHIPPED | OUTPATIENT
Start: 2019-06-17

## 2019-06-17 RX ORDER — NIFEDIPINE 30 MG/1
30 TABLET, FILM COATED, EXTENDED RELEASE ORAL DAILY
Qty: 30 TABLET | Refills: 3 | Status: SHIPPED | OUTPATIENT
Start: 2019-06-17 | End: 2019-06-17

## 2019-06-17 RX ORDER — CHLORTHALIDONE 25 MG/1
25 TABLET ORAL DAILY
Qty: 30 TABLET | Refills: 3 | Status: SHIPPED | OUTPATIENT
Start: 2019-06-17 | End: 2019-06-17

## 2019-06-17 RX ORDER — ATORVASTATIN CALCIUM 40 MG/1
40 TABLET, FILM COATED ORAL NIGHTLY
Qty: 30 TABLET | Refills: 3 | Status: SHIPPED | OUTPATIENT
Start: 2019-06-17

## 2019-06-17 RX ORDER — HYDRALAZINE HYDROCHLORIDE 20 MG/ML
5 INJECTION INTRAMUSCULAR; INTRAVENOUS EVERY 6 HOURS PRN
Status: DISCONTINUED | OUTPATIENT
Start: 2019-06-17 | End: 2019-06-17

## 2019-06-17 RX ORDER — NICOTINE 21 MG/24HR
1 PATCH, TRANSDERMAL 24 HOURS TRANSDERMAL DAILY
Qty: 30 PATCH | Refills: 3 | Status: SHIPPED | OUTPATIENT
Start: 2019-06-18

## 2019-06-17 RX ORDER — ASPIRIN 81 MG/1
81 TABLET ORAL DAILY
Qty: 30 TABLET | Refills: 3 | Status: SHIPPED | OUTPATIENT
Start: 2019-06-18 | End: 2019-06-17

## 2019-06-17 RX ORDER — LISINOPRIL 10 MG/1
10 TABLET ORAL DAILY
Qty: 30 TABLET | Refills: 3 | Status: SHIPPED | OUTPATIENT
Start: 2019-06-17 | End: 2019-06-17

## 2019-06-17 RX ORDER — HYDRALAZINE HYDROCHLORIDE 20 MG/ML
10 INJECTION INTRAMUSCULAR; INTRAVENOUS EVERY 6 HOURS PRN
Status: DISCONTINUED | OUTPATIENT
Start: 2019-06-17 | End: 2019-06-17 | Stop reason: HOSPADM

## 2019-06-17 RX ORDER — ATORVASTATIN CALCIUM 40 MG/1
40 TABLET, FILM COATED ORAL NIGHTLY
Qty: 30 TABLET | Refills: 3 | Status: SHIPPED | OUTPATIENT
Start: 2019-06-17 | End: 2019-06-17

## 2019-06-17 RX ADMIN — CLONIDINE HYDROCHLORIDE 0.1 MG: 0.1 TABLET ORAL at 15:51

## 2019-06-17 RX ADMIN — LISINOPRIL 10 MG: 10 TABLET ORAL at 17:05

## 2019-06-17 RX ADMIN — ENOXAPARIN SODIUM 40 MG: 40 INJECTION SUBCUTANEOUS at 08:13

## 2019-06-17 RX ADMIN — HYDRALAZINE HYDROCHLORIDE 10 MG: 20 INJECTION INTRAMUSCULAR; INTRAVENOUS at 14:47

## 2019-06-17 RX ADMIN — SODIUM CHLORIDE, PRESERVATIVE FREE 10 ML: 5 INJECTION INTRAVENOUS at 08:13

## 2019-06-17 RX ADMIN — ASPIRIN 81 MG: 81 TABLET, COATED ORAL at 08:12

## 2019-06-17 RX ADMIN — NIFEDIPINE 30 MG: 30 TABLET, FILM COATED, EXTENDED RELEASE ORAL at 17:06

## 2019-06-17 ASSESSMENT — PAIN SCALES - GENERAL
PAINLEVEL_OUTOF10: 0

## 2019-06-17 NOTE — PROGRESS NOTES
Hospitalist Progress Note      Name:  Yadira Cantrell /Age/Sex: 1975  (37 y.o. male)   MRN & CSN:  [id number] & [id number] Admission Date/Time: 6/15/2019  5:27 PM   Location:  [address] [place] [address]- PCP: No primary care provider on file. Hospital Day: 3    Assessment and Plan:   Yadira Cantrell is a 37 y.o.  male  who presents with <principal problem not specified>      Acute right hemisphere ischemic stroke with left sided weakness  CTA: acute infarcts in the paramidline right frontal and parietal lobesOcclusion of the right NASREEN at the proximal A2.  neuro check q4h  start aspirin 325mg x1, then 81mg daily  start lipitor 40mg daily. lipid panel , A1C= 6.2  hold BP meds for permissive hypertension  TSH, homocysteine, vit B12/folate noted  Carotid US, echo  MRI/MRA brain. neurology consultation:  1. Needs cardiology and cardio-embolic work up  2. Recommending LINQ and MAMADOU  3. Defer anticoagulation until arrhythmia is proved, if deemed necessary need to wait 7 days to avoid hemorrhagic conversion  4. Needs extensive follow-up this [place] does not live in [place]. PT/OT--> Home      Hypertension  - BP medications modified   -Will keep the patient until BP is better controlled      Smoking  - counseled to quit  - nicotine patch.          Chronic treatment continued per home medications unless  contraindicated by above plan and assessment. The above assessment/plan has been explained to the patient, who indicated understanding. Diet Diet NPO Effective Now   DVT Prophylaxis [x] Lovenox, []  Heparin, [] SCDs, []No VTE prophylaxis, patient ambulating   GI Prophylaxis [] PPI, [] H2 Blocker, [x] No GI prophylaxis, patient is receiving diet/Tube Feeds   Code Status Full Code             History of Present Illness:     Chief Complaint:   37 y.o. male with PMH of  hypertension, tobacco abuse of 1ppd, who presented to ED with right facial droop, left upper and lower extremity weakness/numbness for 5 min.

## 2019-06-17 NOTE — CONSULTS
CARDIOLOGY CONSULT NOTE   Reason for consultation:  CONSULT     Referring physician:  Deonte Pantoja MD     Primary care physician: No primary care provider on file. Dear  Dr. Deonte Pantoja MD   Thanks for the consult. Chief Complaints :  Chief Complaint   Patient presents with    Numbness        History of present illness:Al is a 37 y. o.year old who presents with left arm and left leg weakness . HE has h/o HTn aned Tobacco abuse   He had MRI showing rigtht sided CVA, cardiology was asked to evaluate him further   HE says he does not use his meds regularly . Carotids are normal       Past medical history:    has a past medical history of CVA (cerebral vascular accident) ([address] Hypertension, and Tobacco abuse. Past surgical history:   has no past surgical history on file. Social History:   reports that he has been smoking cigarettes. He has been smoking about 1.00 pack per day. He does not have any smokeless tobacco history on file. He reports that he drank alcohol. He reports that he has current or past drug history. Drug: Marijuana.   Family history:   no family history of CAD, STROKE of DM at early age    No Known Allergies      hydrALAZINE (APRESOLINE) injection 10 mg Q6H PRN   NIFEdipine (PROCARDIA XL) extended release tablet 30 mg Daily   chlorthalidone (HYGROTON) tablet 25 mg Daily   lisinopril (PRINIVIL;ZESTRIL) tablet 10 mg Daily   sodium chloride flush 0.9 % injection 10 mL 2 times per day   sodium chloride flush 0.9 % injection 10 mL PRN   magnesium hydroxide (MILK OF MAGNESIA) 400 MG/5ML suspension 30 mL Daily PRN   ondansetron (ZOFRAN) injection 4 mg Q6H PRN   enoxaparin (LOVENOX) injection 40 mg Daily   aspirin EC tablet 81 mg Daily   atorvastatin (LIPITOR) tablet 40 mg Nightly   nicotine (NICODERM CQ) 21 MG/24HR 1 patch Daily     Current Facility-Administered Medications   Medication Dose Route Frequency Provider Last Rate Last Dose    hydrALAZINE (APRESOLINE) injection 10 mg 10 mg Intravenous Q6H PRN Carloz Justin MD   10 mg at 06/17/19 1447    NIFEdipine (PROCARDIA XL) extended release tablet 30 mg  30 mg Oral Daily Carloz Justin MD        chlorthalidone (HYGROTON) tablet 25 mg  25 mg Oral Daily Carloz Justin MD        lisinopril (PRINIVIL;ZESTRIL) tablet 10 mg  10 mg Oral Daily Carloz Justin MD        sodium chloride flush 0.9 % injection 10 mL  10 mL Intravenous 2 times per day Tri Swann MD   10 mL at 06/17/19 0813    sodium chloride flush 0.9 % injection 10 mL  10 mL Intravenous PRN Tri Swann MD        magnesium hydroxide (MILK OF MAGNESIA) 400 MG/5ML suspension 30 mL  30 mL Oral Daily PRN Tri Swann MD        ondansetron (ZOFRAN) injection 4 mg  4 mg Intravenous Q6H PRN Tri Swann MD        enoxaparin (LOVENOX) injection 40 mg  40 mg Subcutaneous Daily Tri Swann MD   40 mg at 06/17/19 0813    aspirin EC tablet 81 mg  81 mg Oral Daily Tri Swann MD   81 mg at 06/17/19 6795    atorvastatin (LIPITOR) tablet 40 mg  40 mg Oral Nightly Tri Swann MD   40 mg at 06/16/19 2024    nicotine (NICODERM CQ) 21 MG/24HR 1 patch  1 patch Transdermal Daily Tri Swann MD   1 patch at 06/17/19 5916     Review of Systems:   · Constitutional: No Fever or Weight Loss   · Eyes: No Decreased Vision  · ENT: No Headaches, Hearing Loss or Vertigo  · Cardiovascular: As per HPI  · Respiratory: As per HPI  · Gastrointestinal: No abdominal pain, appetite loss, blood in stools, constipation, diarrhea or heartburn  · Genitourinary: No dysuria, trouble voiding, or hematuria  · Musculoskeletal:  No gait disturbance, weakness or joint complaints  · Integumentary: No rash or pruritis  · Neurological: No TIA or stroke symptoms  · Psychiatric: No anxiety or depression  · Endocrine: No malaise, fatigue or temperature intolerance  · Hematologic/Lymphatic: No bleeding problems, blood clots or swollen lymph nodes  · Allergic/Immunologic: No nasal congestion or hives  All systems negative except as marked. Physical Examination:    Vitals:    06/17/19 1340 06/17/19 1348 06/17/19 1406 06/17/19 1551   BP: (!) 154/116 (!) 155/98 (!) 161/115 (!) 167/102   Pulse: 71  65    Resp: 21  12    Temp:       TempSrc:       SpO2: 97% 96% 97%    Weight:       Height:           General Appearance:  No distress, conversant    Constitutional:  Well developed, Well nourished, No acute distress, Non-toxic appearance. HENT:  Normocephalic, Atraumatic, Bilateral external ears normal, Oropharynx moist, No oral exudates, Nose normal. Neck- Normal range of motion, No tenderness, Supple, No stridor,no apical-carotid delay  Lymphatics : no palpable lymph nodes  Eyes:  PERRL, EOMI, Conjunctiva normal, No discharge. Respiratory:  Normal breath sounds, No respiratory distress, No wheezing, No chest tenderness. ,no use of accessory muscles, crackles Absent   Cardiovascular: (PMI) apex non displaced,no lifts no thrills, ankle swelling Absent  , 1+, s1 and s2 audible,Murmur. Absent , JVD not noted    Abdomen /GI:  Bowel sounds normal, Soft, No tenderness, No masses, No gross visceromegaly   :  No costovertebral angle tenderness   Musculoskeletal:  No edema, no tenderness, no deformities.  Back- no tenderness  Integument:  Well hydrated, no rash   Lymphatic:  No lymphadenopathy noted   Neurologic:  Alert & oriented x 3, CN 2-12 normal, normal motor function, normal sensory function, no focal deficits noted           Medical decision making and Data review:    Lab Review   Recent Labs     06/16/19  0427   WBC 4.9   HGB 15.3   HCT 46.5         Recent Labs     06/17/19  0340      K 4.0      CO2 25   BUN 12   CREATININE 0.9     Recent Labs     06/15/19  1746   AST 17   ALT 23   BILITOT 0.2   ALKPHOS 73     Recent Labs     06/15/19  1746 06/17/19  0340 06/17/19  0918   TROPONINT <0.010 <0.010 <0.010       No results for input(s): visualized. There is occlusion of the right anterior cerebral artery at the proximal A2 segment level. The anterior cerebral and middle cerebral arteries demonstrate no focal stenosis. POSTERIOR CIRCULATION: There is intracranial dominance of the left vertebral artery. The right vertebral artery functionally terminates as the right PICA. No flow-limiting stenosis of the basilar artery or bilateral posterior cerebral arteries. 1. Redemonstration of acute infarcts in the paramidline right frontal and parietal lobes, in the right anterior cerebral artery vascular territory distribution. 2. Occlusion of the right anterior cerebral artery at the proximal A2 segment level. Findings were discussed with SHERRILL SMITH at 10:34 a.m. on 06/16/2019. Xr Chest Standard (2 Vw)    Result Date: 6/15/2019  EXAMINATION: TWO XRAY VIEWS OF THE CHEST 6/15/2019 7:42 pm COMPARISON: None. HISTORY: ORDERING SYSTEM PROVIDED HISTORY: weakness TECHNOLOGIST PROVIDED HISTORY: Reason for exam:->weakness Ordering Physician Provided Reason for Exam: weakness Acuity: Unknown Type of Exam: Unknown Initial encounter FINDINGS: The lungs are without acute focal process. There is no effusion or pneumothorax. The cardiomediastinal silhouette is without acute process. The osseous structures are without acute process. No acute process. Ct Head Wo Contrast    Addendum Date: 6/15/2019    ADDENDUM: Findings were discussed with Sukhdev Guillen at 9:01 pm on 6/15/2019. Result Date: 6/15/2019  EXAMINATION: CT OF THE HEAD WITHOUT CONTRAST  6/15/2019 8:48 pm TECHNIQUE: CT of the head was performed without the administration of intravenous contrast. Dose modulation, iterative reconstruction, and/or weight based adjustment of the mA/kV was utilized to reduce the radiation dose to as low as reasonably achievable. COMPARISON: None.  HISTORY: ORDERING SYSTEM PROVIDED HISTORY: left sided weakness, numbness that resolved TECHNOLOGIST PROVIDED HISTORY: Has a \"code stroke\" or \"stroke alert\" been called? ->No Ordering Physician Provided Reason for Exam: left sided weakness, numbness that resolved Acuity: Acute Type of Exam: Initial Relevant Medical/Surgical History: none FINDINGS: BRAIN/VENTRICLES: There is loss of gray-white differentiation within the paramedian right frontal lobe likely reflecting an infarct. No mass or shift or bleed is identified. There is an additional acute infarct within the high right parietal lobe. ORBITS: The visualized portion of the orbits demonstrate no acute abnormality. SINUSES: The visualized paranasal sinuses and mastoid air cells demonstrate no acute abnormality. SOFT TISSUES/SKULL:  No acute abnormality of the visualized skull or soft tissues. Acute infarcts within the right frontal lobe and right parietal lobe. Vascular Carotid Duplex Bilateral    Result Date: 6/16/2019  EXAMINATION: ULTRASOUND EVALUATION OF THE CAROTID ARTERIES 6/16/2019 COMPARISON: None. HISTORY: ORDERING SYSTEM PROVIDED HISTORY: TIA TECHNOLOGIST PROVIDED HISTORY: Reason for exam:->TIA Ordering Physician Provided Reason for Exam: TIA Acuity: Acute Type of Exam: Initial Additional signs and symptoms: speech disturbance, rt facial droop, left sided numbness Relevant Medical/Surgical History: HTN FINDINGS: RIGHT: The right common carotid artery demonstrates peak systolic velocities of 105 cm/sec in the mid segments respectively. The right internal carotid artery demonstrates the systolic velocities of 37, 34, 46 cm/sec in the proximal, mid and distal segments respectively. The external carotid artery is patent. The vertebral artery demonstrates normal antegrade flow. No evidence of focal atherosclerotic plaque. ICA/CCA ratio of 0.4. LEFT: The left common carotid artery demonstrates peak systolic velocities of 991 cm/sec in the mid segments respectively.  The left internal carotid artery demonstrates the systolic velocities of 37, 58, 51 cm/sec in sent to the Results [address] ([facility]) on 6/16/2019 at 12:05 pm to be communicated to the referring/covering health care provider/office. All labs, medications and tests reviewed by myself including data  from outside source , patient and available family . Continue all other medications of all above medical condition listed as is. Impression:  Active Problems:    Ischemic stroke ([address]    Hypertension    Tobacco abuse    CVA (cerebral vascular accident) ([address]  Resolved Problems:    * No resolved hospital problems. *      Assessment: 37 y. o.year old with PMH of  has a past medical history of CVA (cerebral vascular accident) ([address] Hypertension, and Tobacco abuse. Plan and Recommendations:    CVA : will plan MAMADOU , Alternates and risks were discussed   ASA is 2 and Mallampati is 2  Uncontrolled HTN: start nifedipine, chlorthalidone and ace  DVT prophylaxis if no contraindication  6. Dyslipidemia: continue statins           Thank you  much for consult and giving us the opportunity in contributing in the care of this patient. Please feel free to call me for any questions.        Mona Goff MD, 6/17/2019 4:13 PM

## 2019-06-17 NOTE — PLAN OF CARE
Problem: Safety:  Goal: Free from accidental physical injury  Description  Free from accidental physical injury  6/17/2019 0756 by Sugey Azevedo RN  Outcome: Ongoing  6/16/2019 2322 by Jerald Castillo RN  Outcome: Ongoing  Goal: Free from intentional harm  Description  Free from intentional harm  6/17/2019 0756 by Sugey Azevedo RN  Outcome: Ongoing  6/16/2019 2322 by Jerald Castillo RN  Outcome: Ongoing     Problem: Daily Care:  Goal: Daily care needs are met  Description  Daily care needs are met  6/17/2019 0756 by Sugey Azevedo RN  Outcome: Ongoing  6/16/2019 2322 by Jerald Castillo RN  Outcome: Ongoing     Problem: Skin Integrity:  Goal: Skin integrity will stabilize  Description  Skin integrity will stabilize  6/17/2019 0756 by Sugey Azevedo RN  Outcome: Ongoing  6/16/2019 2322 by Jerald Castillo RN  Outcome: Ongoing

## 2019-06-17 NOTE — PROGRESS NOTES
Contacted lab for troponin results. None available yet. Per lab, specimen ordered at  had not been drawn. They are changing request now to stat so that  will see and come to draw.

## 2019-06-17 NOTE — DISCHARGE SUMMARY
Discharge Summary    Name:  Celia Rocha /Age/Sex: 1975  (37 y.o. male)   MRN & CSN:  [id number] & [id number] Admission Date/Time: 6/15/2019  5:27 PM   Attending:  Peter Mancia MD Discharging Physician: Peter Mancia MD     HPI:   Per Admission H&P   The patient is a 37 y.o. male with hypertension, tobacco abuse of 1ppd, who presented to ED with right facial droop, left upper and lower extremity weakness/numbness for 5 min earlier today. He was at rest.  He says he felt dizzy, and felt like he is going to pass out. His girl friend said he had slurry speech at that time. He had similar symptoms 1 week ago, which resolved in 10 minutes or so. In ED, he did not have symptoms initially, but during my evaluation, he complained of intermittent numbness and weakness of left upper and lower ext. CT head in ED showed evidence of Acute infarcts within the right frontal lobe and right parietal lobe. Hospital Course:   Celia Rocha is a 37 y.o.  male  who presents with right facial droop, left upper and lower extremity weakness/numbness    Acute right hemisphere ischemic stroke with left sided weakness  CTA: acute infarcts in the paramidline right frontal and parietal lobesOcclusion of the right NASREEN at the proximal A2.  aspirin  81mg daily and lipitor 40mg daily.     lipid panel , A1C= 6.2  MRI/MRA brain. Scattered acute infarcts involving the right frontal lobe with a few acute infarcts also seen in the right parietal lobe. The patient will follow up with his PCP in [facility]        Hypertension  Continue home medications      Smoking  - counseled to quit  - nicotine patch. The patient expressed appropriate understanding of and agreement with the discharge recommendations, medications, and plan.      Consults this admission:  IP CONSULT TO HOSPITALIST  IP CONSULT TO NEUROLOGY  IP CONSULT TO CARDIOLOGY  IP CONSULT TO CARDIOLOGY    Discharge Instruction:   Follow up appointments: Primary care physician:  within 2 weeks    Diet:  cardiac diet   Activity: activity as tolerated  Disposition: Discharged to:   [x]Home, []HHC, []SNF, []Acute Rehab, []Hospice   Condition on discharge: Stable    Discharge Medications:      [facility], [facility] Medication Instructions SPX:[id number]    Printed on:06/17/19 6923   Medication Information                      aspirin 81 MG EC tablet  Take 1 tablet by mouth daily             atorvastatin (LIPITOR) 40 MG tablet  Take 1 tablet by mouth nightly             ibuprofen (ADVIL;MOTRIN) 200 MG tablet  Take 800 mg by mouth every 6 hours as needed for Pain or Fever             nicotine (NICODERM CQ) 21 MG/24HR  Place 1 patch onto the skin daily                 Objective Findings at Discharge:   BP (!) 161/115   Pulse 65   Temp 97.6 °F (36.4 °C) (Oral)   Resp 12   Ht 6' (1.829 m)   Wt 203 lb 1.6 oz (92.1 kg)   SpO2 97%   BMI 27.55 kg/m²            PHYSICAL EXAM   GEN    Awake male, sitting upright in bed in no apparent distress. Appears given age. EYES   Pupils are equally round. No scleral erythema, discharge, or conjunctivitis. HENT  Mucous membranes are moist.   RESP  Clear to auscultation, no wheezes, rales or rhonchi. CARDIO/VASC           S1/S2 auscultated. No murmurs  GI        Abdomen is soft without significant tenderness, masses, or guarding. MSK    No gross joint deformities. Spontaneous movement of all extremities  SKIN    Normal coloration, warm, dry.   NEURO           Grossly normal  PSYCH            Awake, alert, oriented             BMP/CBC  Recent Labs     06/15/19  1746 06/16/19  0427 06/17/19  0340     --  137   K 3.7  --  4.0     --  104   CO2 25  --  25   BUN 11  --  12   CREATININE 0.9  --  0.9   WBC 5.1 4.9  --    HCT 47.9 46.5  --     222  --        IMAGING:    CTA HEAD W WO CONTRAST [[id number]] Collected: 06/16/19 0946      Order Status: Completed Updated: 06/17/19 4383     Narrative:       EXAMINATION:  CTA OF THE HEAD WITHOUT AND WITH CONTRAST  6/16/2019 9:38 am    TECHNIQUE:  CTA of the head/brain was performed without and with the administration of  intravenous contrast. Multiplanar reformatted images are provided for review. MIP images are provided for review. Dose modulation, iterative  reconstruction, and/or weight based adjustment of the mA/kV was utilized to  reduce the radiation dose to as low as reasonably achievable. COMPARISON:  Noncontrast CT head 06/15/2019    HISTORY:  ORDERING SYSTEM PROVIDED HISTORY: infarction  TECHNOLOGIST PROVIDED HISTORY:  Ordering Physician Provided Reason for Exam: infarction  Acuity: Acute  Type of Exam: Subsequent/Follow-up  Additional signs and symptoms: recent left sided weakness, numbness, now  resolved  Relevant Medical/Surgical History: 80cc [id number]    FINDINGS:    CT HEAD:    BRAIN/VENTRICLES:  No acute intracranial hemorrhage or extraaxial fluid  collection.  Acute infarcts in the paramedian right frontal and parietal  lobes, in the right anterior cerebral artery vascular territory distribution,  again demonstrated.  Remote right basal ganglia lacunar infarct is unchanged. No evidence of mass, mass effect or midline shift. No evidence of  hydrocephalus.  Basal cisterns are patent. ORBITS: The visualized portion of the orbits demonstrate no acute abnormality. SINUSES:  The visualized paranasal sinuses and mastoid air cells demonstrate  no acute abnormality. SOFT TISSUES/SKULL: No acute abnormality of the visualized skull or soft  tissues. CTA HEAD:    ANTERIOR CIRCULATION: The internal carotid arteries are normal in course and  caliber without focal stenosis.  Right A1 segment is hypoplastic.  An  anterior communicating artery is visualized.  There is occlusion of the right  anterior cerebral artery at the proximal A2 segment level.  The anterior  cerebral and middle cerebral arteries demonstrate no focal stenosis.     POSTERIOR CIRCULATION: There is intracranial dominance of the left vertebral  artery.  The right vertebral artery functionally terminates as the right  PICA.  No flow-limiting stenosis of the basilar artery or bilateral posterior  cerebral arteries.     Impression:       1. Redemonstration of acute infarcts in the paramidline right frontal and  parietal lobes, in the right anterior cerebral artery vascular territory  distribution. 2. Occlusion of the right anterior cerebral artery at the proximal A2 segment  level. Findings were discussed with SHERRILL Fink at 10:34 a.m. on 06/16/2019.     MRI brain without contrast [[id number]] Collected: 06/16/19 1202     Order Status: Completed Updated: 06/16/19 1208     Narrative:       EXAMINATION:  MRI OF THE BRAIN WITHOUT CONTRAST  6/16/2019 9:37 am    TECHNIQUE:  Multiplanar multisequence MRI of the brain was performed without the  administration of intravenous contrast.    COMPARISON:  None. HISTORY:  ORDERING SYSTEM PROVIDED HISTORY: TIA  TECHNOLOGIST PROVIDED HISTORY:  Ordering Physician Provided Reason for Exam: c/o r facial numbness, lue and  lle tingling, nkt, no sx to shauna  per pt.    JG  Acuity: Unknown  Type of Exam: Unknown    Initial evaluation. FINDINGS:  INTRACRANIAL STRUCTURES/VENTRICLES: Scattered acute infarcts are seen within  the right frontal lobe.  A few acute infarcts are also seen within the right  parietal lobe.  No mass effect or midline shift. No evidence of an acute  intracranial hemorrhage.  The ventricles and sulci are normal in size and  configuration.  The sellar/suprasellar regions appear unremarkable.  The  normal signal voids within the major intracranial vessels appear maintained  at the skull base. ORBITS: The visualized portion of the orbits demonstrate no acute abnormality. SINUSES: No acute abnormality seen of the paranasal sinuses and mastoid air  cells.     BONES/SOFT TISSUES: The bone marrow signal intensity appears normal. The 0.7.     Impression:       The right internal carotid artery demonstrates 0-50% stenosis . The left internal carotid artery demonstrates 0-50% stenosis . Bilateral vertebral arteries are patent with flow in the normal direction.     MRA head w/o contrast [[id number]]      Order Status: Canceled      CT Head WO Contrast [[id number]] Collected: 06/15/19 2056     Order Status: Completed Updated: 06/15/19 2104     Addenda:         ADDENDUM: Findings were discussed with Juanito Ibarra at 9:01 pm on 6/15/2019.   Signed: 06/15/19 2104 by Sin Guerra MD     Narrative:       EXAMINATION:  CT OF THE HEAD WITHOUT CONTRAST  6/15/2019 8:48 pm    TECHNIQUE:  CT of the head was performed without the administration of intravenous  contrast. Dose modulation, iterative reconstruction, and/or weight based  adjustment of the mA/kV was utilized to reduce the radiation dose to as low  as reasonably achievable. COMPARISON:  None. HISTORY:  ORDERING SYSTEM PROVIDED HISTORY: left sided weakness, numbness that resolved  TECHNOLOGIST PROVIDED HISTORY:  Has a \"code stroke\" or \"stroke alert\" been called? ->No  Ordering Physician Provided Reason for Exam: left sided weakness, numbness  that resolved  Acuity: Acute  Type of Exam: Initial  Relevant Medical/Surgical History: none    FINDINGS:  BRAIN/VENTRICLES: There is loss of gray-white differentiation within the  paramedian right frontal lobe likely reflecting an infarct.  No mass or shift  or bleed is identified. [place] is an additional acute infarct within the high  right parietal lobe. ORBITS: The visualized portion of the orbits demonstrate no acute abnormality. SINUSES: The visualized paranasal sinuses and mastoid air cells demonstrate  no acute abnormality.     SOFT TISSUES/SKULL:  No acute abnormality of the visualized skull or soft  tissues.     Impression:       Acute infarcts within the right frontal lobe and right parietal lobe.     XR CHEST STANDARD (2 VW) [[id number]] Collected: 06/15/19 1950     Order Status: Completed Specimen: Chest Updated: 06/15/19 1953     Narrative:       EXAMINATION:  TWO XRAY VIEWS OF THE CHEST    6/15/2019 7:42 pm    COMPARISON:  None. HISTORY:  ORDERING SYSTEM PROVIDED HISTORY: weakness  TECHNOLOGIST PROVIDED HISTORY:  Reason for exam:->weakness  Ordering Physician Provided Reason for Exam: weakness  Acuity: Unknown  Type of Exam: Unknown    Initial encounter    FINDINGS:  The lungs are without acute focal process.  There is no effusion or  pneumothorax. The cardiomediastinal silhouette is without acute process. The  osseous structures are without acute process.     Impression:       No acute process.        Discharge Time of 35 minutes    Electronically signed by Peter Mancia MD on 6/17/2019 at 3:51 PM

## 2019-06-17 NOTE — DISCHARGE SUMMARY
Discharge Summary    Name:  Alisson Yun /Age/Sex: 1975  (37 y.o. male)   MRN & CSN:  [id number] & [id number] Admission Date/Time: 6/15/2019  5:27 PM   Attending:  Carolina Schuster MD Discharging Physician: Carolina Schuster MD     HPI:   Per Admission H&P   The patient is a 37 y.o. male with hypertension, tobacco abuse of 1ppd, who presented to ED with right facial droop, left upper and lower extremity weakness/numbness for 5 min earlier today. He was at rest.  He says he felt dizzy, and felt like he is going to pass out. His girl friend said he had slurry speech at that time. He had similar symptoms 1 week ago, which resolved in 10 minutes or so. In ED, he did not have symptoms initially, but during my evaluation, he complained of intermittent numbness and weakness of left upper and lower ext. CT head in ED showed evidence of Acute infarcts within the right frontal lobe and right parietal lobe. Hospital Course:   Alisson Yun is a 37 y.o.  male  who presents with right facial droop, left upper and lower extremity weakness/numbness     Acute right hemisphere ischemic stroke with left sided weakness  CTA: acute infarcts in the paramidline right frontal and parietal lobesOcclusion of the right NASREEN at the proximal A2.  aspirin  81mg daily and lipitor 40mg daily.     lipid panel , A1C= 6.2  MRI/MRA brain. Scattered acute infarcts involving the right frontal lobe with a few acute infarcts also seen in the right parietal lobe. The patient will follow up with his PCP in [facility]         Hypertension  Continue current medications   Follow up BP closely with PCP     Smoking  - counseled to quit  - nicotine patch.                The patient expressed appropriate understanding of and agreement with the discharge recommendations, medications, and plan.      Consults this admission:  IP CONSULT TO HOSPITALIST  IP CONSULT TO NEUROLOGY  IP CONSULT TO CARDIOLOGY  IP CONSULT TO CARDIOLOGY    Discharge Instruction:   Follow up appointments:   Primary care physician:  within 2 weeks    Diet:  cardiac diet   Activity: activity as tolerated  Disposition: Discharged to:   [x]Home, []HHC, []SNF, []Acute Rehab, []Hospice   Condition on discharge: Stable    Discharge Medications:      Keesha Dao   Home Medication Instructions [id number]    Printed on:06/17/19 6117   Medication Information                      aspirin 81 MG EC tablet  Take 1 tablet by mouth daily             atorvastatin (LIPITOR) 40 MG tablet  Take 1 tablet by mouth nightly             chlorthalidone (HYGROTON) 25 MG tablet  Take 1 tablet by mouth daily             ibuprofen (ADVIL;MOTRIN) 200 MG tablet  Take 800 mg by mouth every 6 hours as needed for Pain or Fever             lisinopril (PRINIVIL;ZESTRIL) 10 MG tablet  Take 1 tablet by mouth daily             nicotine (NICODERM CQ) 21 MG/24HR  Place 1 patch onto the skin daily             NIFEdipine (ADALAT CC) 30 MG extended release tablet  Take 1 tablet by mouth daily                 Objective Findings at Discharge:   BP (!) 167/102   Pulse 65   Temp 97.6 °F (36.4 °C) (Oral)   Resp 12   Ht 6' (1.829 m)   Wt 203 lb 1.6 oz (92.1 kg)   SpO2 97%   BMI 27.55 kg/m²            PHYSICAL EXAM   GEN    Awake male, sitting upright in bed in no apparent distress. Appears given age. EYES   Pupils are equally round.  No scleral erythema, discharge, or conjunctivitis. HENT  Mucous membranes are moist.   RESP  Clear to auscultation, no wheezes, rales or rhonchi.    CARDIO/VASC           S1/S2 auscultated. No murmurs  GI        Abdomen is soft without significant tenderness, masses, or guarding. MSK    No gross joint deformities. Spontaneous movement of all extremities  SKIN    Normal coloration, warm, dry.   NEURO           Grossly normal  PSYCH            Awake, alert, oriented              BMP/CBC  Recent Labs     06/15/19  1746 06/16/19  0427 06/17/19  0340     -- 137   K 3.7  --  4.0     --  104   CO2 25  --  25   BUN 11  --  12   CREATININE 0.9  --  0.9   WBC 5.1 4.9  --    HCT 47.9 46.5  --     222  --        IMAGING:    CTA HEAD W WO CONTRAST [[id number]] Collected: 06/16/19 0946      Order Status: Completed Updated: 06/17/19 1124     Narrative:       EXAMINATION:  CTA OF THE HEAD WITHOUT AND WITH CONTRAST  6/16/2019 9:38 am    TECHNIQUE:  CTA of the head/brain was performed without and with the administration of  intravenous contrast. Multiplanar reformatted images are provided for review. MIP images are provided for review. Dose modulation, iterative  reconstruction, and/or weight based adjustment of the mA/kV was utilized to  reduce the radiation dose to as low as reasonably achievable. COMPARISON:  Noncontrast CT head 06/15/2019    HISTORY:  ORDERING SYSTEM PROVIDED HISTORY: infarction  TECHNOLOGIST PROVIDED HISTORY:  Ordering Physician Provided Reason for Exam: infarction  Acuity: Acute  Type of Exam: Subsequent/Follow-up  Additional signs and symptoms: recent left sided weakness, numbness, now  resolved  Relevant Medical/Surgical History: 80cc [id number]    FINDINGS:    CT HEAD:    BRAIN/VENTRICLES:  No acute intracranial hemorrhage or extraaxial fluid  collection.  Acute infarcts in the paramedian right frontal and parietal  lobes, in the right anterior cerebral artery vascular territory distribution,  again demonstrated.  Remote right basal ganglia lacunar infarct is unchanged. No evidence of mass, mass effect or midline shift. No evidence of  hydrocephalus.  Basal cisterns are patent. ORBITS: The visualized portion of the orbits demonstrate no acute abnormality. SINUSES:  The visualized paranasal sinuses and mastoid air cells demonstrate  no acute abnormality. SOFT TISSUES/SKULL: No acute abnormality of the visualized skull or soft  tissues.       CTA HEAD:    ANTERIOR CIRCULATION: The internal carotid arteries are normal in course and  caliber without focal stenosis.  Right A1 segment is hypoplastic.  An  anterior communicating artery is visualized.  There is occlusion of the right  anterior cerebral artery at the proximal A2 segment level.  The anterior  cerebral and middle cerebral arteries demonstrate no focal stenosis. POSTERIOR CIRCULATION: There is intracranial dominance of the left vertebral  artery.  The right vertebral artery functionally terminates as the right  PICA.  No flow-limiting stenosis of the basilar artery or bilateral posterior  cerebral arteries.     Impression:       1. Redemonstration of acute infarcts in the paramidline right frontal and  parietal lobes, in the right anterior cerebral artery vascular territory  distribution. 2. Occlusion of the right anterior cerebral artery at the proximal A2 segment  level. Findings were discussed with SHERRILL Rodriguez at 10:34 a.m. on 06/16/2019.     MRI brain without contrast [[id number]] Collected: 06/16/19 1202     Order Status: Completed Updated: 06/16/19 1208     Narrative:       EXAMINATION:  MRI OF THE BRAIN WITHOUT CONTRAST  6/16/2019 9:37 am    TECHNIQUE:  Multiplanar multisequence MRI of the brain was performed without the  administration of intravenous contrast.    COMPARISON:  None. HISTORY:  ORDERING SYSTEM PROVIDED HISTORY: TIA  TECHNOLOGIST PROVIDED HISTORY:  Ordering Physician Provided Reason for Exam: c/o r facial numbness, lue and  lle tingling, nkt, no sx to shauna  per pt.    JG  Acuity: Unknown  Type of Exam: Unknown    Initial evaluation. FINDINGS:  INTRACRANIAL STRUCTURES/VENTRICLES: Scattered acute infarcts are seen within  the right frontal lobe.  A few acute infarcts are also seen within the right  parietal lobe.  No mass effect or midline shift.  No evidence of an acute  intracranial hemorrhage.  The ventricles and sulci are normal in size and  configuration.  The sellar/suprasellar regions appear unremarkable.  The  normal signal voids within the major intracranial vessels appear maintained  at the skull base. ORBITS: The visualized portion of the orbits demonstrate no acute abnormality. SINUSES: No acute abnormality seen of the paranasal sinuses and mastoid air  cells. BONES/SOFT TISSUES: The bone marrow signal intensity appears normal. The soft  tissues demonstrate no acute abnormality.     Impression:       1. Scattered acute infarcts involving the right frontal lobe with a few acute  infarcts also seen in the right parietal lobe. 2. No evidence of mass effect or midline shift. 3. Otherwise, no acute intracranial abnormality. These results were sent to the [facility] [address] ([address] [place] [address]) on  6/16/2019 at 12:05 pm to be communicated to the referring/covering health  care provider/office.     Vascular carotid duplex bilateral [[id number]] Collected: 06/16/19 0703     Order Status: Completed Updated: 06/16/19 0709     Narrative:       EXAMINATION:  ULTRASOUND EVALUATION OF THE CAROTID ARTERIES    6/16/2019    COMPARISON:  None. HISTORY:  ORDERING SYSTEM PROVIDED HISTORY: TIA  TECHNOLOGIST PROVIDED HISTORY:  Reason for exam:->TIA  Ordering Physician Provided Reason for Exam: TIA  Acuity: Acute  Type of Exam: Initial  Additional signs and symptoms: speech disturbance, rt facial droop, left  sided numbness  Relevant Medical/Surgical History: HTN    FINDINGS:    RIGHT:    The right common carotid artery demonstrates peak systolic velocities of 742  cm/sec in the mid segments respectively. The right internal carotid artery demonstrates the systolic velocities of 37,  34, 46 cm/sec in the proximal, mid and distal segments respectively. The external carotid artery is patent.  The vertebral artery demonstrates  normal antegrade flow. No evidence of focal atherosclerotic plaque. ICA/CCA ratio of 0.4. LEFT:    The left common carotid artery demonstrates peak systolic velocities of 726  cm/sec in the mid segments respectively.     The left internal carotid artery demonstrates the systolic velocities of 37,  58, 51 cm/sec in the proximal, mid and distal segments respectively. The external carotid artery is patent.  The vertebral artery demonstrates  normal antegrade flow. No evidence of focal atherosclerotic plaque. ICA/CCA ratio of 0.7.     Impression:       The right internal carotid artery demonstrates 0-50% stenosis . The left internal carotid artery demonstrates 0-50% stenosis . Bilateral vertebral arteries are patent with flow in the normal direction.     MRA head w/o contrast [[id number]]      Order Status: Canceled      CT Head WO Contrast [[id number]] Collected: 06/15/19 2056     Order Status: Completed Updated: 06/15/19 2104     Addenda:         ADDENDUM: Findings were discussed with Juanito Ibarra at 9:01 pm on 6/15/2019.   Signed: 06/15/19 2104 by Sin Guerra MD     Narrative:       EXAMINATION:  CT OF THE HEAD WITHOUT CONTRAST  6/15/2019 8:48 pm    TECHNIQUE:  CT of the head was performed without the administration of intravenous  contrast. Dose modulation, iterative reconstruction, and/or weight based  adjustment of the mA/kV was utilized to reduce the radiation dose to as low  as reasonably achievable. COMPARISON:  None. HISTORY:  ORDERING SYSTEM PROVIDED HISTORY: left sided weakness, numbness that resolved  TECHNOLOGIST PROVIDED HISTORY:  Has a \"code stroke\" or \"stroke alert\" been called? ->No  Ordering Physician Provided Reason for Exam: left sided weakness, numbness  that resolved  Acuity: Acute  Type of Exam: Initial  Relevant Medical/Surgical History: none    FINDINGS:  BRAIN/VENTRICLES: There is loss of gray-white differentiation within the  paramedian right frontal lobe likely reflecting an infarct.  No mass or shift  or bleed is identified. [place] is an additional acute infarct within the high  right parietal lobe. ORBITS: The visualized portion of the orbits demonstrate no acute abnormality.     SINUSES: The visualized paranasal sinuses and mastoid air cells demonstrate  no acute abnormality. SOFT TISSUES/SKULL:  No acute abnormality of the visualized skull or soft  tissues.     Impression:       Acute infarcts within the right frontal lobe and right parietal lobe.     XR CHEST STANDARD (2 VW) [[id number]] Collected: 06/15/19 1950     Order Status: Completed Specimen: Chest Updated: 06/15/19 1953     Narrative:       EXAMINATION:  TWO XRAY VIEWS OF THE CHEST    6/15/2019 7:42 pm    COMPARISON:  None. HISTORY:  ORDERING SYSTEM PROVIDED HISTORY: weakness  TECHNOLOGIST PROVIDED HISTORY:  Reason for exam:->weakness  Ordering Physician Provided Reason for Exam: weakness  Acuity: Unknown  Type of Exam: Unknown    Initial encounter    FINDINGS:  The lungs are without acute focal process.  There is no effusion or  pneumothorax. The cardiomediastinal silhouette is without acute process. The  osseous structures are without acute process.     Impression:       No acute process.        Discharge Time of 35 minutes    Electronically signed by Mary Ann Madera MD on 6/17/2019 at 4:21 PM

## 2019-06-17 NOTE — CARE COORDINATION
Patient discharged home on 6/17/2019    CN met with patient at on 6/17, sitting on side of bed, while doing education patient states that 2 weeks ago was having balance issues. Patient states does not have PCP, there is a clinic where he lives in [place], [place] explained needs to go to the clinic and have them assist with finding a PCP. Patient verbalized understanding. STROKE INITIAL ASSESSMENT      What symptoms brought you in to the hospital? Patient came in with right facial droop, left side weakness and numbness, dizziness and slurred speech. Symptoms resolved in ED initially, then had intermittent numbness and tingling during examination by doctor . Had similar symptoms 1 week ago which resolved in 10 minutes. CTA showed: acute infarcts in the paramidline right frontal and parietal lobes Occlusion of the right NASREEN at the proximal A2. Last Known Well:unknown -past week    Do you have a Neurologist?No  Name:    Where do you live:       [x] Home in [place]       [] Independent Living     [] Assisted Living                  [] [address]   [] Homeless/Homeless Shelter   [] [facility]    Primary Physician:   []   [x] None    [] PA/NP   [] [facility]    Pharmacy:        [] [facility]        [] [facility]        [] [facility]   [] [facility]      [] [facility]    [] [facility]   [x] [place]              [] [address] [facility]    [] [place] [address]   [] [place]   [] [address] [place] [address]    [] [facility]   [] [facility]   [] [employer]   [] Other:          Meds to Beds:   [] Yes  [x] No  Home Care:         [] Yes, Name:       [x] No            SNF:  [] Yes, Name:   [x] No      Do you take a blood thinner? No  Do you take a statins? No    Are you out of any of your  home medications? [] Yes   [x] No  Can you pay for your meds?    [x] Yes   [] No  Do you have transportation:            [x] Yes   [] No          What time do you prefer your appointments:  [] AM   [] PM  [x] Anytime    Goals for this admission:   Patient wants to:go back to [place] today    Steps to meet goal:   1. Medications   2. Work with therapy   3.         CN provided education to patient on reducing risk for stroke/TIA by modifying /controlling risk factors:of . HTN, CVA. Smoking. .. Instructed to take the medications as prescribed and dont stop unless ordered by a physician. Educated on  need to follow-up with physician after discharge . Discussed benefits of eating a healthy diet, regularly exercising, and not smoking. Copy of educational materials given to the patient/caregiver to take home, reviewed signs and symptoms of stroke, including sudden numbness, dizziness, or loss of coordination or balance; weakness on one side of the body, sudden confusion, difficulty speaking, understanding or swallowing, sudden loss of vision, or severe, sudden headache. Emphasized the need to call 911 immediately if they are experiencing signs and symptoms of stroke. BEFAST education provided, BEFAST magnet given to patient. All education with teachback and questions answered     CN Contact information card given to patient/caregiver    Dysphagia screen done prior to any oral intake (including meds)                                       Yes   Date & Time of screening-6/15 2130  Date & time of first medication-6/15 2208  Did the pt fail the dysphagia screen? If yes,call the physician for SLP order, make the patient NPO and change oral medications to other routes  No    VTE Prophylaxis given by day 2 of admission ( day 1 starts on adm date,this excludes obs status and ED)  Yes  If VTE not ordered, did the physician chart BOTH a pharmacological and mechanical reasons ( in context to VTE) why it wan not ordered?   NA  Was the patient given an antithrombotic by end of day 2? (day 1 starts on patients arrival date)  Yes  If the pt did not have an order for antithrombotic by day 2, did the physician document why the pt did not receive it? ( NPO status and an allergy to an antithrombotic are not acceptable reasons)  NA    Did the pt receive education on how to call 911 and documented that handout was given to pt/caregiver? Yes  Did the pt receive education on stroke symptoms and documented that handout was given to pt/caregiver? Yes  Did the patient receive education on risk factors for stroke and documented that handout was given to pt/caregiver? Yes  Does the pt have the personalized stroke factors checklist added the the AVS with appropriate risk factors checked? Yes    Did the pt receive a list of medications that are DC'D on the AVS ?  NA  Was the pt assessed by PT/OT or SLP? If not is there a physician note that pt is back to baseline,no PT/OT/SLP eval needed or if there is an outpt referral. PT/OT phone #[phone] SLP phone # [phone]  Yes   If pt has history of A-fib/flutter do they have a RX for an anticoagulation medication or a reason charted by physician on why one was not prescribed to the patient?   Yes  Does the pt have  RX for a statin on DC? (pt does not need a RX for the following: LDL less than 70 MG/DL, allergy/intolerance or a written reason why one was not prescribed per physician)  Yes

## 2019-06-18 LAB
ANTICARDIOLIPIN IGA ANTIBODY: 2 APL (ref 0–11)
ANTICARDIOLIPIN IGA ANTIBODY: ABNORMAL APL (ref 0–11)
ANTICARDIOLIPIN IGG ANTIBODY: 4 GPL (ref 0–14)
ANTICARDIOLIPIN IGG ANTIBODY: ABNORMAL GPL (ref 0–14)
ANTITHROMBIN ACTIVITY: 103 % (ref 76–128)
ANTITHROMBIN ACTIVITY: NORMAL % (ref 76–128)
CARDIOLIPIN AB IGM: 0 MPL (ref 0–12)
CARDIOLIPIN AB IGM: ABNORMAL MPL (ref 0–12)
EKG ATRIAL RATE: 51 BPM
EKG DIAGNOSIS: NORMAL
EKG P AXIS: 25 DEGREES
EKG P-R INTERVAL: 178 MS
EKG Q-T INTERVAL: 402 MS
EKG QRS DURATION: 80 MS
EKG QTC CALCULATION (BAZETT): 370 MS
EKG R AXIS: 17 DEGREES
EKG T AXIS: 16 DEGREES
EKG VENTRICULAR RATE: 51 BPM
FACTOR VIII ACTIVITY: 104 % (ref 56–191)
FACTOR VIII ACTIVITY: NORMAL % (ref 56–191)
T4 TOTAL: 6.96 UG/DL (ref 5.1–14.1)
T4 TOTAL: NORMAL UG/DL (ref 5.1–14.1)

## 2019-06-19 LAB
PROTEIN C ACTIVITY: 119 % (ref 83–168)
PROTEIN C ACTIVITY: NORMAL % (ref 83–168)
PROTEIN S ACTIVITY: 78 % (ref 66–143)
PROTEIN S ACTIVITY: NORMAL % (ref 66–143)

## 2019-06-20 LAB
FACTOR V LEIDEN: NEGATIVE
FACTOR V LEIDEN: NORMAL
MTHFR BY PCR SPECIMEN: NORMAL
MTHFR INTERPRETATION: NORMAL
MTHFR INTERPRETATION: NORMAL
MTHFR MUTATION A1298C: NORMAL
MTHFR MUTATION C677T: NEGATIVE
PHOSPHATIDYLSERINE IGA ANTIBODY: 2 U/ML (ref 0–19)
PHOSPHATIDYLSERINE IGA ANTIBODY: ABNORMAL U/ML (ref 0–19)
PHOSPHATIDYLSERINE IGG ANTIBODY: 5 U/ML (ref 0–10)
PHOSPHATIDYLSERINE IGG ANTIBODY: ABNORMAL U/ML (ref 0–10)
PHOSPHATIDYLSERINE IGM ANTIBODY: 37 U/ML (ref 0–24)
PHOSPHATIDYLSERINE IGM ANTIBODY: ABNORMAL U/ML (ref 0–24)
PROTHROMBIN G20210A MUTATION: NEGATIVE
PROTHROMBIN G20210A MUTATION: NORMAL

## 2019-07-10 ENCOUNTER — APPOINTMENT (OUTPATIENT)
Dept: CT IMAGING | Age: 44
End: 2019-07-10
Payer: MEDICARE

## 2019-07-10 ENCOUNTER — HOSPITAL ENCOUNTER (EMERGENCY)
Age: 44
Discharge: ANOTHER HOSPITAL | End: 2019-07-10
Attending: EMERGENCY MEDICINE
Payer: MEDICARE

## 2019-07-10 ENCOUNTER — APPOINTMENT (OUTPATIENT)
Dept: GENERAL RADIOLOGY | Age: 44
End: 2019-07-10
Payer: MEDICARE

## 2019-07-10 VITALS
DIASTOLIC BLOOD PRESSURE: 89 MMHG | WEIGHT: 203 LBS | BODY MASS INDEX: 26.9 KG/M2 | SYSTOLIC BLOOD PRESSURE: 131 MMHG | RESPIRATION RATE: 18 BRPM | HEART RATE: 70 BPM | OXYGEN SATURATION: 98 % | HEIGHT: 73 IN

## 2019-07-10 DIAGNOSIS — I63.511 CEREBROVASCULAR ACCIDENT (CVA) DUE TO OCCLUSION OF RIGHT MIDDLE CEREBRAL ARTERY (HCC): Primary | ICD-10-CM

## 2019-07-10 LAB
ALBUMIN SERPL-MCNC: 4.1 GM/DL (ref 3.4–5)
ALP BLD-CCNC: 75 IU/L (ref 40–129)
ALT SERPL-CCNC: 24 U/L (ref 10–40)
ANION GAP SERPL CALCULATED.3IONS-SCNC: 11 MMOL/L (ref 4–16)
AST SERPL-CCNC: 26 IU/L (ref 15–37)
BASOPHILS ABSOLUTE: 0 K/CU MM
BASOPHILS RELATIVE PERCENT: 0.8 % (ref 0–1)
BILIRUB SERPL-MCNC: 0.4 MG/DL (ref 0–1)
BUN BLDV-MCNC: 22 MG/DL (ref 6–23)
CALCIUM SERPL-MCNC: 9.1 MG/DL (ref 8.3–10.6)
CHLORIDE BLD-SCNC: 97 MMOL/L (ref 99–110)
CHP ED QC CHECK: NORMAL
CO2: 27 MMOL/L (ref 21–32)
CREAT SERPL-MCNC: 1.2 MG/DL (ref 0.9–1.3)
DIFFERENTIAL TYPE: ABNORMAL
EOSINOPHILS ABSOLUTE: 0.2 K/CU MM
EOSINOPHILS RELATIVE PERCENT: 3.1 % (ref 0–3)
GFR AFRICAN AMERICAN: >60 ML/MIN/1.73M2
GFR NON-AFRICAN AMERICAN: >60 ML/MIN/1.73M2
GLUCOSE BLD-MCNC: 179 MG/DL
GLUCOSE BLD-MCNC: 179 MG/DL (ref 70–99)
GLUCOSE BLD-MCNC: 197 MG/DL (ref 70–99)
HCT VFR BLD CALC: 46.9 % (ref 42–52)
HEMOGLOBIN: 15.8 GM/DL (ref 13.5–18)
IMMATURE NEUTROPHIL %: 0.2 % (ref 0–0.43)
INR BLD: 1.06 INDEX
LYMPHOCYTES ABSOLUTE: 2.4 K/CU MM
LYMPHOCYTES RELATIVE PERCENT: 47.4 % (ref 24–44)
MAGNESIUM: 2 MG/DL (ref 1.8–2.4)
MCH RBC QN AUTO: 31.3 PG (ref 27–31)
MCHC RBC AUTO-ENTMCNC: 33.7 % (ref 32–36)
MCV RBC AUTO: 93.1 FL (ref 78–100)
MONOCYTES ABSOLUTE: 0.7 K/CU MM
MONOCYTES RELATIVE PERCENT: 14.4 % (ref 0–4)
NUCLEATED RBC %: 0 %
PDW BLD-RTO: 12.2 % (ref 11.7–14.9)
PLATELET # BLD: 287 K/CU MM (ref 140–440)
PMV BLD AUTO: 10.5 FL (ref 7.5–11.1)
POTASSIUM SERPL-SCNC: 3.3 MMOL/L (ref 3.5–5.1)
PROTHROMBIN TIME: 12.3 SECONDS (ref 9.12–12.5)
RBC # BLD: 5.04 M/CU MM (ref 4.6–6.2)
SEGMENTED NEUTROPHILS ABSOLUTE COUNT: 1.7 K/CU MM
SEGMENTED NEUTROPHILS RELATIVE PERCENT: 34.1 % (ref 36–66)
SODIUM BLD-SCNC: 135 MMOL/L (ref 135–145)
TOTAL IMMATURE NEUTOROPHIL: 0.01 K/CU MM
TOTAL NUCLEATED RBC: 0 K/CU MM
TOTAL PROTEIN: 7.5 GM/DL (ref 6.4–8.2)
TROPONIN T: <0.01 NG/ML
WBC # BLD: 5.1 K/CU MM (ref 4–10.5)

## 2019-07-10 PROCEDURE — 70498 CT ANGIOGRAPHY NECK: CPT

## 2019-07-10 PROCEDURE — 85025 COMPLETE CBC W/AUTO DIFF WBC: CPT

## 2019-07-10 PROCEDURE — 84484 ASSAY OF TROPONIN QUANT: CPT

## 2019-07-10 PROCEDURE — 96374 THER/PROPH/DIAG INJ IV PUSH: CPT

## 2019-07-10 PROCEDURE — 93005 ELECTROCARDIOGRAM TRACING: CPT | Performed by: EMERGENCY MEDICINE

## 2019-07-10 PROCEDURE — 6360000004 HC RX CONTRAST MEDICATION: Performed by: EMERGENCY MEDICINE

## 2019-07-10 PROCEDURE — 85610 PROTHROMBIN TIME: CPT

## 2019-07-10 PROCEDURE — 82962 GLUCOSE BLOOD TEST: CPT

## 2019-07-10 PROCEDURE — 6360000002 HC RX W HCPCS: Performed by: EMERGENCY MEDICINE

## 2019-07-10 PROCEDURE — 83735 ASSAY OF MAGNESIUM: CPT

## 2019-07-10 PROCEDURE — 71045 X-RAY EXAM CHEST 1 VIEW: CPT

## 2019-07-10 PROCEDURE — 70450 CT HEAD/BRAIN W/O DYE: CPT

## 2019-07-10 PROCEDURE — 93010 ELECTROCARDIOGRAM REPORT: CPT | Performed by: INTERNAL MEDICINE

## 2019-07-10 PROCEDURE — 80053 COMPREHEN METABOLIC PANEL: CPT

## 2019-07-10 PROCEDURE — 99285 EMERGENCY DEPT VISIT HI MDM: CPT

## 2019-07-10 RX ORDER — LORAZEPAM 2 MG/ML
1 INJECTION INTRAMUSCULAR ONCE
Status: COMPLETED | OUTPATIENT
Start: 2019-07-10 | End: 2019-07-10

## 2019-07-10 RX ORDER — SODIUM CHLORIDE 0.9 % (FLUSH) 0.9 %
10 SYRINGE (ML) INJECTION PRN
Status: DISCONTINUED | OUTPATIENT
Start: 2019-07-10 | End: 2019-07-10 | Stop reason: HOSPADM

## 2019-07-10 RX ADMIN — IOPAMIDOL 75 ML: 755 INJECTION, SOLUTION INTRAVENOUS at 08:52

## 2019-07-10 RX ADMIN — LORAZEPAM 1 MG: 2 INJECTION, SOLUTION INTRAMUSCULAR; INTRAVENOUS at 10:10

## 2019-07-10 NOTE — ED NOTES
Called [facility] transfer ctr @ [id number] spoke with Adelaide Ramirez about transferring pt   Adelaide Ramirez called back @ 1949 and spoke with  at this time     Angela Maravilla  07/10/19 [id number]

## 2019-07-10 NOTE — ED PROVIDER NOTES
Commands:  0 - performs both tasks correctly    Best Gaze:  0 - normal    Visual Fields:  0 - no visual loss    Facial Palsy:  2 - partial paralysis (total or near total paralysis of the lower face)    Motor-Arm-Left:  1 - drift, limb holds 90 (or 45) degrees but drifts down before full 10 seconds: does not hit bed    Motor-Leg-Left:  1 - drift; leg falls by the end of the 5 second period but does not hit bed    Motor-Arm-Right:  0 - no drift, limb holds 90 (or 45) degrees for full 10 seconds    Motor-Leg-Right:  0 - no drift; leg holds 30 degree position for full 5 seconds    Limb Ataxia:  1 - present in one limb    Sensory:  0 - normal; no sensory loss    Best Language:  0    Dysarthria:  0 - normal    Extinction and Inattention:  0 - no abnormality  PSYCHIATRIC: Normal mood. I have reviewed and interpreted all of the currently available lab results from this visit (if applicable):  No results found for this visit on 07/10/19. Radiographs (if obtained):  [] The following radiograph was interpreted by myself in the absence of a radiologist:  [x] Radiologist's Report Reviewed:  Cta Head W Wo Contrast    Result Date: 6/17/2019  EXAMINATION: CTA OF THE HEAD WITHOUT AND WITH CONTRAST  6/16/2019 9:38 am TECHNIQUE: CTA of the head/brain was performed without and with the administration of intravenous contrast. Multiplanar reformatted images are provided for review. MIP images are provided for review. Dose modulation, iterative reconstruction, and/or weight based adjustment of the mA/kV was utilized to reduce the radiation dose to as low as reasonably achievable.  COMPARISON: Noncontrast CT head 06/15/2019 HISTORY: ORDERING SYSTEM PROVIDED HISTORY: infarction TECHNOLOGIST PROVIDED HISTORY: Ordering Physician Provided Reason for Exam: infarction Acuity: Acute Type of Exam: Subsequent/Follow-up Additional signs and symptoms: recent left sided weakness, numbness, now resolved Relevant Medical/Surgical History: 80cc 6/15/2019    ADDENDUM: Findings were discussed with Maury Singh at 9:01 pm on 6/15/2019. Result Date: 6/15/2019  EXAMINATION: CT OF THE HEAD WITHOUT CONTRAST  6/15/2019 8:48 pm TECHNIQUE: CT of the head was performed without the administration of intravenous contrast. Dose modulation, iterative reconstruction, and/or weight based adjustment of the mA/kV was utilized to reduce the radiation dose to as low as reasonably achievable. COMPARISON: None. HISTORY: ORDERING SYSTEM PROVIDED HISTORY: left sided weakness, numbness that resolved TECHNOLOGIST PROVIDED HISTORY: Has a \"code stroke\" or \"stroke alert\" been called? ->No Ordering Physician Provided Reason for Exam: left sided weakness, numbness that resolved Acuity: Acute Type of Exam: Initial Relevant Medical/Surgical History: none FINDINGS: BRAIN/VENTRICLES: There is loss of gray-white differentiation within the paramedian right frontal lobe likely reflecting an infarct. No mass or shift or bleed is identified. There is an additional acute infarct within the high right parietal lobe. ORBITS: The visualized portion of the orbits demonstrate no acute abnormality. SINUSES: The visualized paranasal sinuses and mastoid air cells demonstrate no acute abnormality. SOFT TISSUES/SKULL:  No acute abnormality of the visualized skull or soft tissues. Acute infarcts within the right frontal lobe and right parietal lobe. Xr Chest Portable    Result Date: 7/10/2019  EXAMINATION: ONE XRAY VIEW OF THE CHEST 7/10/2019 7:51 am COMPARISON: 06/15/2019. HISTORY: ORDERING SYSTEM PROVIDED HISTORY: other TECHNOLOGIST PROVIDED HISTORY: Reason for exam:->other Reason for Exam: extremity weakness Acuity: Acute Type of Exam: Initial FINDINGS: The heart size is within normal limits. The pulmonary vasculature is also within normal limits. No acute infiltrates are seen. The costophrenic angles are sharp bilaterally. No pneumothoraces are noted. 1. No active pulmonary disease.

## 2019-07-10 NOTE — ED NOTES
Spoke with [facility] stroke [facility].  Neurologist, Dr Jessica Dotson to call back     Nehal Callaway RN  07/10/19 [id number]       Nehal Callaway RN  07/10/19 1106

## 2019-07-16 LAB
EKG ATRIAL RATE: 82 BPM
EKG DIAGNOSIS: NORMAL
EKG P AXIS: 52 DEGREES
EKG P-R INTERVAL: 152 MS
EKG Q-T INTERVAL: 356 MS
EKG QRS DURATION: 90 MS
EKG QTC CALCULATION (BAZETT): 415 MS
EKG R AXIS: 43 DEGREES
EKG T AXIS: 11 DEGREES
EKG VENTRICULAR RATE: 82 BPM

## 2019-08-01 ENCOUNTER — HOSPITAL ENCOUNTER (OUTPATIENT)
Age: 44
Discharge: HOME | End: 2019-08-01
Payer: MEDICARE

## 2019-08-01 LAB
ALBUMIN SERPL-MCNC: 3.7 GM/DL (ref 3.4–5)
ALP BLD-CCNC: 75 IU/L (ref 40–129)
ALT SERPL-CCNC: 24 U/L (ref 10–40)
ANION GAP SERPL CALCULATED.3IONS-SCNC: 12 MMOL/L (ref 4–16)
AST SERPL-CCNC: 15 IU/L (ref 15–37)
BASOPHILS ABSOLUTE: 0.1 K/CU MM
BASOPHILS RELATIVE PERCENT: 1 % (ref 0–1)
BILIRUB SERPL-MCNC: 0.4 MG/DL (ref 0–1)
BUN BLDV-MCNC: 21 MG/DL (ref 6–23)
CALCIUM SERPL-MCNC: 9.6 MG/DL (ref 8.3–10.6)
CHLORIDE BLD-SCNC: 91 MMOL/L (ref 99–110)
CO2: 28 MMOL/L (ref 21–32)
CREAT SERPL-MCNC: 0.8 MG/DL (ref 0.9–1.3)
DIFFERENTIAL TYPE: ABNORMAL
EOSINOPHILS ABSOLUTE: 0.1 K/CU MM
EOSINOPHILS RELATIVE PERCENT: 2.1 % (ref 0–3)
GFR AFRICAN AMERICAN: >60 ML/MIN/1.73M2
GFR NON-AFRICAN AMERICAN: >60 ML/MIN/1.73M2
GLUCOSE BLD-MCNC: 279 MG/DL (ref 70–99)
HCT VFR BLD CALC: 41.6 % (ref 42–52)
HEMOGLOBIN: 13.7 GM/DL (ref 13.5–18)
IMMATURE NEUTROPHIL %: 0.2 % (ref 0–0.43)
LYMPHOCYTES ABSOLUTE: 1.5 K/CU MM
LYMPHOCYTES RELATIVE PERCENT: 30.2 % (ref 24–44)
MCH RBC QN AUTO: 31.4 PG (ref 27–31)
MCHC RBC AUTO-ENTMCNC: 32.9 % (ref 32–36)
MCV RBC AUTO: 95.2 FL (ref 78–100)
MONOCYTES ABSOLUTE: 0.6 K/CU MM
MONOCYTES RELATIVE PERCENT: 12.7 % (ref 0–4)
NUCLEATED RBC %: 0 %
PDW BLD-RTO: 13 % (ref 11.7–14.9)
PLATELET # BLD: 494 K/CU MM (ref 140–440)
PMV BLD AUTO: 10 FL (ref 7.5–11.1)
POTASSIUM SERPL-SCNC: 5.4 MMOL/L (ref 3.5–5.1)
RBC # BLD: 4.37 M/CU MM (ref 4.6–6.2)
SEGMENTED NEUTROPHILS ABSOLUTE COUNT: 2.6 K/CU MM
SEGMENTED NEUTROPHILS RELATIVE PERCENT: 53.8 % (ref 36–66)
SODIUM BLD-SCNC: 131 MMOL/L (ref 135–145)
TOTAL IMMATURE NEUTOROPHIL: 0.01 K/CU MM
TOTAL NUCLEATED RBC: 0 K/CU MM
TOTAL PROTEIN: 7.1 GM/DL (ref 6.4–8.2)
WBC # BLD: 4.9 K/CU MM (ref 4–10.5)

## 2019-08-01 PROCEDURE — 85025 COMPLETE CBC W/AUTO DIFF WBC: CPT

## 2019-08-01 PROCEDURE — 36415 COLL VENOUS BLD VENIPUNCTURE: CPT

## 2019-08-01 PROCEDURE — 80053 COMPREHEN METABOLIC PANEL: CPT

## 2019-09-16 ENCOUNTER — HOSPITAL ENCOUNTER (OUTPATIENT)
Age: 44
Discharge: HOME | End: 2019-09-16
Payer: MEDICARE

## 2019-09-16 LAB
ANION GAP SERPL CALCULATED.3IONS-SCNC: 11 MMOL/L (ref 4–16)
BUN BLDV-MCNC: 8 MG/DL (ref 6–23)
CALCIUM SERPL-MCNC: 9 MG/DL (ref 8.3–10.6)
CHLORIDE BLD-SCNC: 102 MMOL/L (ref 99–110)
CO2: 27 MMOL/L (ref 21–32)
CREAT SERPL-MCNC: 0.6 MG/DL (ref 0.9–1.3)
GFR AFRICAN AMERICAN: >60 ML/MIN/1.73M2
GFR NON-AFRICAN AMERICAN: >60 ML/MIN/1.73M2
GLUCOSE BLD-MCNC: 116 MG/DL (ref 70–99)
POTASSIUM SERPL-SCNC: 3.9 MMOL/L (ref 3.5–5.1)
SODIUM BLD-SCNC: 140 MMOL/L (ref 135–145)

## 2019-09-16 PROCEDURE — 36415 COLL VENOUS BLD VENIPUNCTURE: CPT

## 2019-09-16 PROCEDURE — 80048 BASIC METABOLIC PNL TOTAL CA: CPT

## 2019-10-10 NOTE — PROGRESS NOTES
My body is shaking for a few days. My pressure is elevated. I saw my PMD and he started me on a water pill. New onset flipped T waves on tele. He was sinus fozia to sinus rhythm, and is currently sinus fozia. 158/98, HR 47, rr 14, Spo2 97% on room air. Denies any chest pain or pressure. Dr Rodriguez Heading notified via [employer].

## 2020-11-03 PROBLEM — I63.9 CVA (CEREBRAL VASCULAR ACCIDENT) (HCC): Status: RESOLVED | Noted: 2020-11-03 | Resolved: 2020-11-03
